# Patient Record
Sex: FEMALE | Race: WHITE | Employment: OTHER | URBAN - METROPOLITAN AREA
[De-identification: names, ages, dates, MRNs, and addresses within clinical notes are randomized per-mention and may not be internally consistent; named-entity substitution may affect disease eponyms.]

---

## 2017-01-03 ENCOUNTER — HOSPITAL ENCOUNTER (INPATIENT)
Facility: HOSPITAL | Age: 42
LOS: 1 days | Discharge: HOME | DRG: 072 | End: 2017-01-04
Attending: NEUROLOGICAL SURGERY | Admitting: NEUROLOGICAL SURGERY
Payer: MEDICARE

## 2017-01-03 ENCOUNTER — OFFICE VISIT (OUTPATIENT)
Dept: NEUROLOGY | Facility: CLINIC | Age: 42
End: 2017-01-03

## 2017-01-03 ENCOUNTER — APPOINTMENT (OUTPATIENT)
Dept: CT IMAGING | Facility: HOSPITAL | Age: 42
DRG: 072 | End: 2017-01-03
Attending: NEUROLOGICAL SURGERY
Payer: MEDICARE

## 2017-01-03 ENCOUNTER — NURSE ONLY (OUTPATIENT)
Dept: HEMATOLOGY/ONCOLOGY | Facility: HOSPITAL | Age: 42
End: 2017-01-03
Attending: NEUROLOGICAL SURGERY
Payer: OTHER GOVERNMENT

## 2017-01-03 VITALS
RESPIRATION RATE: 18 BRPM | DIASTOLIC BLOOD PRESSURE: 70 MMHG | TEMPERATURE: 99 F | HEIGHT: 67.01 IN | BODY MASS INDEX: 24.7 KG/M2 | WEIGHT: 157.38 LBS | HEART RATE: 71 BPM | SYSTOLIC BLOOD PRESSURE: 112 MMHG

## 2017-01-03 DIAGNOSIS — Z98.890 S/P CRANIOTOMY: Primary | ICD-10-CM

## 2017-01-03 DIAGNOSIS — D32.9 MENINGIOMA (HCC): ICD-10-CM

## 2017-01-03 DIAGNOSIS — T88.8XXA FLUID COLLECTION AT SURGICAL SITE, INITIAL ENCOUNTER: ICD-10-CM

## 2017-01-03 DIAGNOSIS — R90.0 INTRACRANIAL MASS: ICD-10-CM

## 2017-01-03 PROBLEM — G93.89 SUBGALEAL FLUID COLLECTION: Status: ACTIVE | Noted: 2017-01-03

## 2017-01-03 LAB
APTT PPP: 31.3 SECONDS (ref 25–34)
BASOPHILS # BLD AUTO: 0.11 X10(3) UL (ref 0–0.1)
BASOPHILS NFR BLD AUTO: 0.8 %
BUN BLD-MCNC: 13 MG/DL (ref 8–20)
CALCIUM BLD-MCNC: 8.6 MG/DL (ref 8.3–10.3)
CHLORIDE: 107 MMOL/L (ref 101–111)
CO2: 24 MMOL/L (ref 22–32)
CREAT BLD-MCNC: 0.77 MG/DL (ref 0.55–1.02)
EOSINOPHIL # BLD AUTO: 0.21 X10(3) UL (ref 0–0.3)
EOSINOPHIL NFR BLD AUTO: 1.6 %
ERYTHROCYTE [DISTWIDTH] IN BLOOD BY AUTOMATED COUNT: 12.4 % (ref 11.5–16)
GLUCOSE BLD-MCNC: 126 MG/DL (ref 70–99)
HCT VFR BLD AUTO: 35.7 % (ref 34–50)
HGB BLD-MCNC: 12.1 G/DL (ref 12–16)
IMMATURE GRANULOCYTE COUNT: 0.11 X10(3) UL (ref 0–1)
IMMATURE GRANULOCYTE RATIO %: 0.8 %
INR BLD: 0.95 (ref 0.89–1.12)
LYMPHOCYTES # BLD AUTO: 2.52 X10(3) UL (ref 0.9–4)
LYMPHOCYTES NFR BLD AUTO: 19.4 %
MCH RBC QN AUTO: 30 PG (ref 27–33.2)
MCHC RBC AUTO-ENTMCNC: 33.9 G/DL (ref 31–37)
MCV RBC AUTO: 88.6 FL (ref 81–100)
MONOCYTES # BLD AUTO: 0.52 X10(3) UL (ref 0.1–0.6)
MONOCYTES NFR BLD AUTO: 4 %
NEUTROPHIL ABS PRELIM: 9.49 X10 (3) UL (ref 1.3–6.7)
NEUTROPHILS # BLD AUTO: 9.49 X10(3) UL (ref 1.3–6.7)
NEUTROPHILS NFR BLD AUTO: 73.4 %
PLATELET # BLD AUTO: 225 10(3)UL (ref 150–450)
POTASSIUM SERPL-SCNC: 3.6 MMOL/L (ref 3.6–5.1)
PSA SERPL DL<=0.01 NG/ML-MCNC: 13 SECONDS (ref 12.3–14.8)
RBC # BLD AUTO: 4.03 X10(6)UL (ref 3.8–5.1)
RED CELL DISTRIBUTION WIDTH-SD: 40.5 FL (ref 35.1–46.3)
SODIUM SERPL-SCNC: 138 MMOL/L (ref 136–144)
WBC # BLD AUTO: 13 X10(3) UL (ref 4–13)

## 2017-01-03 PROCEDURE — 71275 CT ANGIOGRAPHY CHEST: CPT

## 2017-01-03 PROCEDURE — 99024 POSTOP FOLLOW-UP VISIT: CPT | Performed by: NURSE PRACTITIONER

## 2017-01-03 PROCEDURE — 0W9130Z DRAINAGE OF CRANIAL CAVITY WITH DRAINAGE DEVICE, PERCUTANEOUS APPROACH: ICD-10-PCS | Performed by: NEUROLOGICAL SURGERY

## 2017-01-03 PROCEDURE — 99211 OFF/OP EST MAY X REQ PHY/QHP: CPT

## 2017-01-03 RX ORDER — CODEINE SULFATE 30 MG/1
30 TABLET ORAL EVERY 4 HOURS PRN
Status: DISCONTINUED | OUTPATIENT
Start: 2017-01-03 | End: 2017-01-04

## 2017-01-03 RX ORDER — LEVETIRACETAM 500 MG/1
500 TABLET ORAL 2 TIMES DAILY
Status: DISCONTINUED | OUTPATIENT
Start: 2017-01-03 | End: 2017-01-04

## 2017-01-03 RX ORDER — PANTOPRAZOLE SODIUM 20 MG/1
20 TABLET, DELAYED RELEASE ORAL
Status: DISCONTINUED | OUTPATIENT
Start: 2017-01-04 | End: 2017-01-04

## 2017-01-03 RX ORDER — CODEINE SULFATE 30 MG/1
TABLET ORAL EVERY 4 HOURS PRN
Status: DISCONTINUED | OUTPATIENT
Start: 2017-01-03 | End: 2017-01-03 | Stop reason: SDUPTHER

## 2017-01-03 RX ORDER — CODEINE SULFATE 30 MG/1
60 TABLET ORAL EVERY 4 HOURS PRN
Status: DISCONTINUED | OUTPATIENT
Start: 2017-01-03 | End: 2017-01-04

## 2017-01-03 RX ORDER — SODIUM CHLORIDE 9 MG/ML
INJECTION, SOLUTION INTRAVENOUS CONTINUOUS
Status: DISCONTINUED | OUTPATIENT
Start: 2017-01-03 | End: 2017-01-04

## 2017-01-03 RX ORDER — ACETAMINOPHEN 500 MG
1000 TABLET ORAL EVERY 6 HOURS PRN
Status: DISCONTINUED | OUTPATIENT
Start: 2017-01-03 | End: 2017-01-04

## 2017-01-03 RX ORDER — FLUTICASONE PROPIONATE 50 MCG
2 SPRAY, SUSPENSION (ML) NASAL DAILY PRN
Status: DISCONTINUED | OUTPATIENT
Start: 2017-01-03 | End: 2017-01-04

## 2017-01-03 RX ORDER — ONDANSETRON 2 MG/ML
4 INJECTION INTRAMUSCULAR; INTRAVENOUS EVERY 6 HOURS PRN
Status: DISCONTINUED | OUTPATIENT
Start: 2017-01-03 | End: 2017-01-04

## 2017-01-03 RX ORDER — ONDANSETRON 2 MG/ML
INJECTION INTRAMUSCULAR; INTRAVENOUS
Status: DISPENSED
Start: 2017-01-03 | End: 2017-01-04

## 2017-01-03 RX ORDER — ONDANSETRON 2 MG/ML
4 INJECTION INTRAMUSCULAR; INTRAVENOUS ONCE
Status: COMPLETED | OUTPATIENT
Start: 2017-01-03 | End: 2017-01-03

## 2017-01-03 NOTE — PROGRESS NOTES
[address] [facility],[address]    Multidisciplinary Neuro-Oncology Clinic  Outpatient Follow up      Jaycee Benitez  : 1975  1/3/2017  PCP: None Pcp     REASON FOR VISIT:   F/o following left frontal craniotomy 26      HIS daily as needed for Rhinitis. Disp:  Rfl:    HydrOXYzine HCl 10 MG Oral Tab Take 10 mg by mouth nightly as needed. Disp:  Rfl:    LORazepam 0.5 MG Oral Tab Take 0.5 mg by mouth 2 (two) times daily as needed for Anxiety.  Disp:  Rfl:    omeprazole 20 MG Oral of the    resection cavity with a small nodular focus measuring up to 4 x 5 mm anteriorly that could be due to reactive changes from the recent surgery, with a small amount of residual tumor not entirely excluded.  There are minimal areas of restricted diff options surgical and non surgical were reviewed and discussed at length. All questions were answered. 1/3/2017; Dr Chidi Mendoza examined  Jessie Corea.  [facility], [facility]  [address] [facility], [address] [facility] Tunde Alvarado, [address]  [phone]

## 2017-01-03 NOTE — PROGRESS NOTES
Education Record    Learner:  Patient    Disease / Diagnosis:    Barriers / Limitations:  None   Comments:    Method:  Discussion   Comments:    General Topics:  Plan of care reviewed   Comments:    Outcome:  Shows understanding   Comments:    Admission to

## 2017-01-03 NOTE — PATIENT INSTRUCTIONS
Please do MRI brain with and without contrast 2/13/17 prior to follow-up in neuro-oncology clinic 2/14/17

## 2017-01-04 ENCOUNTER — APPOINTMENT (OUTPATIENT)
Dept: ULTRASOUND IMAGING | Facility: HOSPITAL | Age: 42
DRG: 072 | End: 2017-01-04
Attending: NEUROLOGICAL SURGERY
Payer: MEDICARE

## 2017-01-04 VITALS
TEMPERATURE: 99 F | RESPIRATION RATE: 14 BRPM | SYSTOLIC BLOOD PRESSURE: 112 MMHG | OXYGEN SATURATION: 96 % | DIASTOLIC BLOOD PRESSURE: 68 MMHG | HEART RATE: 70 BPM

## 2017-01-04 LAB
BASOPHILS # BLD AUTO: 0.09 X10(3) UL (ref 0–0.1)
BASOPHILS NFR BLD AUTO: 1.1 %
BUN BLD-MCNC: 11 MG/DL (ref 8–20)
CALCIUM BLD-MCNC: 8.6 MG/DL (ref 8.3–10.3)
CHLORIDE: 108 MMOL/L (ref 101–111)
CO2: 25 MMOL/L (ref 22–32)
CREAT BLD-MCNC: 0.76 MG/DL (ref 0.55–1.02)
EOSINOPHIL # BLD AUTO: 0.23 X10(3) UL (ref 0–0.3)
EOSINOPHIL NFR BLD AUTO: 2.8 %
ERYTHROCYTE [DISTWIDTH] IN BLOOD BY AUTOMATED COUNT: 12.4 % (ref 11.5–16)
GLUCOSE BLD-MCNC: 94 MG/DL (ref 70–99)
HCT VFR BLD AUTO: 36.2 % (ref 34–50)
HGB BLD-MCNC: 12.3 G/DL (ref 12–16)
IMMATURE GRANULOCYTE COUNT: 0.09 X10(3) UL (ref 0–1)
IMMATURE GRANULOCYTE RATIO %: 1.1 %
LYMPHOCYTES # BLD AUTO: 3.3 X10(3) UL (ref 0.9–4)
LYMPHOCYTES NFR BLD AUTO: 40.1 %
MCH RBC QN AUTO: 29.9 PG (ref 27–33.2)
MCHC RBC AUTO-ENTMCNC: 34 G/DL (ref 31–37)
MCV RBC AUTO: 88.1 FL (ref 81–100)
MONOCYTES # BLD AUTO: 0.45 X10(3) UL (ref 0.1–0.6)
MONOCYTES NFR BLD AUTO: 5.5 %
NEUTROPHIL ABS PRELIM: 4.07 X10 (3) UL (ref 1.3–6.7)
NEUTROPHILS # BLD AUTO: 4.07 X10(3) UL (ref 1.3–6.7)
NEUTROPHILS NFR BLD AUTO: 49.4 %
PLATELET # BLD AUTO: 216 10(3)UL (ref 150–450)
POTASSIUM SERPL-SCNC: 4.1 MMOL/L (ref 3.6–5.1)
RBC # BLD AUTO: 4.11 X10(6)UL (ref 3.8–5.1)
RED CELL DISTRIBUTION WIDTH-SD: 40.2 FL (ref 35.1–46.3)
SODIUM SERPL-SCNC: 141 MMOL/L (ref 136–144)
WBC # BLD AUTO: 8.2 X10(3) UL (ref 4–13)

## 2017-01-04 PROCEDURE — 93970 EXTREMITY STUDY: CPT

## 2017-01-04 PROCEDURE — 99254 IP/OBS CNSLTJ NEW/EST MOD 60: CPT | Performed by: OTHER

## 2017-01-04 RX ORDER — SULFAMETHOXAZOLE AND TRIMETHOPRIM 800; 160 MG/1; MG/1
1 TABLET ORAL 2 TIMES DAILY
Qty: 14 TABLET | Refills: 0 | Status: SHIPPED | OUTPATIENT
Start: 2017-01-04 | End: 2017-01-11

## 2017-01-04 RX ORDER — CODEINE SULFATE 60 MG/1
TABLET ORAL EVERY 6 HOURS PRN
Qty: 40 TABLET | Refills: 0 | Status: SHIPPED | OUTPATIENT
Start: 2017-01-04 | End: 2017-01-18

## 2017-01-04 NOTE — PAYOR COMM NOTE
Attending Physician: Hamzah Stiles DO    Review Type: ADMISSION   Reviewer: Natacha العلي       Date: January 4, 2017 - 8:48 AM  Payor: [facility]  Authorization Number: N/A  Admit date: 1/3/2017  4:01 PM   Admitted from Emergency Dept.: (SUBLIMAZE) 0.05 MG/ML injection 50 mcg     Date Action Dose Route User    1/3/2017 1747 Given 50 mcg Intravenous Sallye Nageotte, RN      iohexol (OMNIPAQUE) 350 MG/ML injection 60 mL     Date Action Dose Route User    1/3/2017 2320 Given 60 mL Intravenou CBC W/ DIFFERENTIAL[[id number]]          Abnormal            Final result                 Please view results for these tests on the individual orders. CBC WITH DIFFERENTIAL WITH PLATELET    Narrative:      The following orders were created

## 2017-01-04 NOTE — H&P
[facility] Neurosurgery  H&P      Mara Wilson  : 1975  1/3/2017  PCP: None Pcp      CC: scalp fluid collection      HISTORY OF PRESENT ILLNESS:  Mara Wilson is a 35year old female. reports tenderness to incision.  she feels fluid collection has gotten lar mouth nightly as needed.  Disp:   Rfl:     LORazepam 0.5 MG Oral Tab  Take 0.5 mg by mouth 2 (two) times daily as needed for Anxiety.  Disp:   Rfl:     omeprazole 20 MG Oral Capsule Delayed Release  Take 20 mg by mouth 2 (two) times daily before meals.  Ivone Richter to 4 x 5 mm anteriorly that could be due to reactive changes from the recent surgery, with a small amount of residual tumor not entirely excluded.  There are minimal areas of restricted diffusion on   the margins of the resection cavity which could be due t discussed at length. All questions were answered. 1/3/2017; Dr Rosie Walton examined  Damon Cool.  GEE Jeffery  [facility]  [address] [facility], [address] [place] [address] [facility], [address] [place] [address] Rd  [phone]  10:45 AM    I have seen and examined the patient a history of meningioma. Venous Doppler BL LE since patient has complained of edema and SOB. No anticoagulants tonight while we see how drain works. Manda Shelton

## 2017-01-04 NOTE — PROGRESS NOTES
[id number] [facility] Progress Note    Shahana Barton Patient Status:  Inpatient    1975 MRN [id number]   [facility] 6NE-A Attending Montse Epps, DO   Hosp Day # 1 PCP None Pcp     Subjective:  Shahana Barton is a(n) 35 year home later today once seen by surgeon  Above discussed with Dr. Tasneem Leon, APN  [address] [facility] [address]  Pager [phone]  1/4/2017, 10:14 AM

## 2017-01-04 NOTE — PROGRESS NOTES
Received patient as a direct admit from Neurosurgery office. Oriented patient to room and instructed to change into gown. Dr. Bailey Day at bedside to perform aspiration of fluid collection with placement of subgaleal drain.  Correct patient, procedure, size, an

## 2017-01-04 NOTE — PROGRESS NOTES
[facility]  Neurocritical Care APN Progress Note    NAME: Bianca Felipe - ROOM: [facility]/2367-R - MRN: [id number] - Age: 35year old - :1975    History Of Present Illness:  Bianca Felipe is a 35year old female who is s/p craniotomy for or leg weakness noted. 5/5 bilaterally. Finger-to-nose coordination is intact. Gait deferred.     Data this admission:  CTA chest:  CONCLUSION:  No CT evidence for pulmonary embolism.     3.8mm subpleural nodule right upper lobe anteriorly.     Bilateral

## 2017-01-04 NOTE — PLAN OF CARE
NEUROLOGICAL - ADULT    • Achieves stable or improved neurological status Progressing    • Absence of seizures Progressing    • Remains free of injury related to seizure activity Progressing    • Achieves maximal functionality and self care Progressing

## 2017-01-04 NOTE — CONSULTS
[facility]  Neurocritical Care       Name: Luis Cassidy  MRN: [id number]  Admission Date/Time: 1/3/2017  4:01 PM  Primary Care Provider:  None Pcp        Reason for Consultation:    Headache, side effects from [address].     HPI:   Patient is Disp:  Rfl:  Not Taking   omeprazole 20 MG Oral Capsule Delayed Release Take 20 mg by mouth 2 (two) times daily before meals.  Disp:  Rfl:  Not Taking   Phenylephrine in Hard Fat 0.25 % Rectal Suppos Place 1 suppository rectally daily as needed for Hemorrho or open areas. Neuro:                  Headache, side effects from [address]. All other systems were reviewed and are negative. Vitals:   Blood pressure 118/75, pulse 83, temperature 98.7 °F (37.1 °C), temperature source Oral, resp.  rate 16, last menst USED:  60 cc of Omnipaque 350       FINDINGS:  VASCULATURE:  No evidence of pulmonary embolism. THORACIC AORTA:  No thoracic aortic dissection. LUNGS:  3.8 mm subpleural nodule in the right upper lobe. No focal pulmonary infiltrate or consolidation. . MEDIA 31.3 01/03/2017   INR 0.95 01/03/2017       Assesment/Plan:   Active Problems:    Subgaleal fluid collection    1. Subgaleal fluid collection s/p left pterional craniotomy              -Drain managed by NS, 20cc removed yesterday by [facility].

## 2017-01-04 NOTE — PLAN OF CARE
Assumed care at 1915, patient resting in bed, alert and oriented x4, VSS per monitor, subgaleal drain in place, open to gravity, no drainage noted.  Blood labs drawn, 20g IV started to right [facility], patient taken for CTA of chest. Spoke with Dr. Edin Mena via phone

## 2017-01-05 ENCOUNTER — TELEPHONE (OUTPATIENT)
Dept: SURGERY | Facility: CLINIC | Age: 42
End: 2017-01-05

## 2017-01-05 NOTE — DISCHARGE SUMMARY
[facility]  Discharge Summary    Bandar Pena Patient Status:  Inpatient    1975 MRN [id number]   [facility] 7NE-A Attending No att. providers found   Hosp Day # 1 PCP None Pcp     Date of Admission: 1/3/2017    Date of Discharge: placement for extended drainage. The patient also has described an intolerance to Keppra as she states it makes her agitated and this was tapered during the admission off entirely by the neurologist.  See their notes for further detail.        [facility] week.    Follow up Labs: None. Other Discharge Instructions: Patient is to follow-up with neurosurgery in the clinic in 1 week.   She is to wear a snug dressing that compresses the scalp on the left frontal temporal fossa to the skull temporalis fascia

## 2017-01-05 NOTE — PROCEDURES
Date of procedure: January 3, 2017. Procedure: Placement of left frontal subgaleal catheter for drainage.     Preprocedure diagnosis: left frontal subgaleal fluid collection    Postop adenosis: Left frontal subgaleal fluid collection    Indication for pr catheter was hooked up to an EVD Bare troll draining device. The drainage. Trial was placed lower than the patient's body for gravity draining.   No egressive fluid was shown in the catheter as the initial aspirate clear the palpable subgaleal fluid colle

## 2017-01-05 NOTE — TELEPHONE ENCOUNTER
Informed patient, she states her brother filled Bactrim at outside pharmacy  Appreciated the call back.

## 2017-01-05 NOTE — TELEPHONE ENCOUNTER
Patient asking to have our office fax or call in her prescription for antibiotic  She does not need Narcotic called in.   Provided # for [facility] [phone], Dr. Garcia Zimmerman with pharmacist, they do not accept outside prescriptions  Order has to come

## 2017-01-09 ENCOUNTER — TELEPHONE (OUTPATIENT)
Dept: SURGERY | Facility: CLINIC | Age: 42
End: 2017-01-09

## 2017-01-09 NOTE — TELEPHONE ENCOUNTER
Spoke with patient who stated she had a procedure done on 1/3/17 by [facility] for fluid drainage. Patient was told she could rewrap this so she did this on Saturday.  When she woke up Sunday morning she could feel fluid moving around again and was starting t

## 2017-01-10 ENCOUNTER — NURSE ONLY (OUTPATIENT)
Dept: HEMATOLOGY/ONCOLOGY | Facility: HOSPITAL | Age: 42
End: 2017-01-10
Attending: NEUROLOGICAL SURGERY
Payer: OTHER GOVERNMENT

## 2017-01-10 ENCOUNTER — OFFICE VISIT (OUTPATIENT)
Dept: NEUROLOGY | Facility: CLINIC | Age: 42
End: 2017-01-10

## 2017-01-10 VITALS
HEART RATE: 91 BPM | RESPIRATION RATE: 20 BRPM | SYSTOLIC BLOOD PRESSURE: 105 MMHG | WEIGHT: 156.63 LBS | HEIGHT: 67.01 IN | BODY MASS INDEX: 24.58 KG/M2 | TEMPERATURE: 100 F | DIASTOLIC BLOOD PRESSURE: 66 MMHG | OXYGEN SATURATION: 98 %

## 2017-01-10 DIAGNOSIS — R90.0 INTRACRANIAL MASS: ICD-10-CM

## 2017-01-10 DIAGNOSIS — Z98.890 S/P CRANIOTOMY: Primary | ICD-10-CM

## 2017-01-10 DIAGNOSIS — D33.0 BENIGN NEOPLASM OF SUPRATENTORIAL REGION OF BRAIN (HCC): ICD-10-CM

## 2017-01-10 PROCEDURE — 99211 OFF/OP EST MAY X REQ PHY/QHP: CPT

## 2017-01-10 PROCEDURE — 99024 POSTOP FOLLOW-UP VISIT: CPT | Performed by: NURSE PRACTITIONER

## 2017-01-10 NOTE — PROGRESS NOTES
[address] [facility],[address]    Multidisciplinary Neuro-Oncology Clinic  Outpatient Follow up      Luiz Roa  : 1975  1/10/2017  PCP: None Pcp  Referring Provider: No ref.  provider found    REASON FOR VISIT:  Post-op f/u year old female. Incision:Left temporal incision edges well approximated, no drainage, no erythema. Mild tenderness. Temporal muscle wasting w/ visible pulsation. Small fluid collection.  Pressure dressing in place and removed for examination  NEUROLOGICAL fluid.  Since she has marked atrophy of the temporalis muscle we may use bone cement to rebuild and buttress the temporalis muscle for cosmesis. This may have to be postponed if there is any infection present. Manda Shelton

## 2017-01-10 NOTE — PROGRESS NOTES
Education Record    Learner:  Patient and Spouse    Disease / Diagnosis:    Barriers / Limitations:  None   Comments:    Method:  Discussion   Comments:    General Topics:  Plan of care reviewed   Comments:    Outcome:  Shows understanding   Comments:

## 2017-01-12 ENCOUNTER — TELEPHONE (OUTPATIENT)
Dept: SURGERY | Facility: CLINIC | Age: 42
End: 2017-01-12

## 2017-01-17 ENCOUNTER — APPOINTMENT (OUTPATIENT)
Dept: HEMATOLOGY/ONCOLOGY | Facility: HOSPITAL | Age: 42
End: 2017-01-17
Attending: NEUROLOGICAL SURGERY
Payer: OTHER GOVERNMENT

## 2017-01-18 ENCOUNTER — OFFICE VISIT (OUTPATIENT)
Dept: SURGERY | Facility: CLINIC | Age: 42
End: 2017-01-18

## 2017-01-18 VITALS
RESPIRATION RATE: 14 BRPM | BODY MASS INDEX: 23.7 KG/M2 | WEIGHT: 151 LBS | SYSTOLIC BLOOD PRESSURE: 112 MMHG | DIASTOLIC BLOOD PRESSURE: 60 MMHG | HEART RATE: 78 BPM | HEIGHT: 67 IN

## 2017-01-18 DIAGNOSIS — Z98.890 POST-OPERATIVE STATE: Primary | ICD-10-CM

## 2017-01-18 PROCEDURE — 99024 POSTOP FOLLOW-UP VISIT: CPT | Performed by: NEUROLOGICAL SURGERY

## 2017-01-18 NOTE — PATIENT INSTRUCTIONS
Refill policies:    • Allow 2 business days for refills; controlled substances may take longer.   • Contact your pharmacy at least 5 days prior to running out of medication and have them send an electronic request or submit request through the “request re your physician has recommended that you have a procedure or additional testing performed. [facility] will contact your insurance carrier to obtain pre-certification or prior authorization.     Unfortunately, VANDANA has seen an increas

## 2017-01-19 ENCOUNTER — TELEPHONE (OUTPATIENT)
Dept: SURGERY | Facility: CLINIC | Age: 42
End: 2017-01-19

## 2017-01-19 DIAGNOSIS — R90.0 INTRACRANIAL MASS: Primary | ICD-10-CM

## 2017-01-19 NOTE — TELEPHONE ENCOUNTER
Lashonda for surgery authorization  Told she was never referred to [facility] we must start authorization with PCP office  Transferred and then disconnected    (total time 44:10)    Informed patient of the above, she provided another contact # [phone]

## 2017-01-19 NOTE — TELEPHONE ENCOUNTER
Patient scheduled for Left frontal cranial wound exploration and cranioplasty.  on 1/24/17 with Dr. Marisa Brownlee    Pre-op instructions discussed with patient and surgical packet provided:    · You will need to contact the Pre-admission department at [phone]

## 2017-01-19 NOTE — TELEPHONE ENCOUNTER
[employer] form and clinicals faxed to [facility] for surgery auth  Confirmation received

## 2017-01-20 RX ORDER — ACETAMINOPHEN 325 MG/1
325 TABLET ORAL EVERY 6 HOURS PRN
COMMUNITY

## 2017-01-20 RX ORDER — IBUPROFEN 200 MG
200 TABLET ORAL EVERY 6 HOURS PRN
Status: ON HOLD | COMMUNITY
End: 2017-01-25

## 2017-01-21 ENCOUNTER — LAB ENCOUNTER (OUTPATIENT)
Dept: LAB | Age: 42
End: 2017-01-21
Attending: NEUROLOGICAL SURGERY
Payer: OTHER GOVERNMENT

## 2017-01-21 DIAGNOSIS — D49.6 BRAIN TUMOR (HCC): ICD-10-CM

## 2017-01-21 LAB
ANTIBODY SCREEN: NEGATIVE
BILIRUB UR QL STRIP.AUTO: NEGATIVE
CLARITY UR REFRACT.AUTO: CLEAR
GLUCOSE UR STRIP.AUTO-MCNC: NEGATIVE MG/DL
KETONES UR STRIP.AUTO-MCNC: NEGATIVE MG/DL
LEUKOCYTE ESTERASE UR QL STRIP.AUTO: NEGATIVE
NITRITE UR QL STRIP.AUTO: NEGATIVE
PH UR STRIP.AUTO: 7 [PH] (ref 4.5–8)
PROT UR STRIP.AUTO-MCNC: NEGATIVE MG/DL
RH BLOOD TYPE: POSITIVE
SP GR UR STRIP.AUTO: 1.01 (ref 1–1.03)
UROBILINOGEN UR STRIP.AUTO-MCNC: <2 MG/DL

## 2017-01-21 PROCEDURE — 86920 COMPATIBILITY TEST SPIN: CPT

## 2017-01-21 PROCEDURE — 86850 RBC ANTIBODY SCREEN: CPT

## 2017-01-21 PROCEDURE — 86901 BLOOD TYPING SEROLOGIC RH(D): CPT

## 2017-01-21 PROCEDURE — 81001 URINALYSIS AUTO W/SCOPE: CPT

## 2017-01-21 PROCEDURE — 86900 BLOOD TYPING SEROLOGIC ABO: CPT

## 2017-01-21 PROCEDURE — 36415 COLL VENOUS BLD VENIPUNCTURE: CPT

## 2017-01-23 ENCOUNTER — ANESTHESIA EVENT (OUTPATIENT)
Dept: SURGERY | Facility: HOSPITAL | Age: 42
DRG: 025 | End: 2017-01-23
Payer: MEDICARE

## 2017-01-23 NOTE — PROGRESS NOTES
[facility]   Outpatient Neurological Surgery Follow Up    Josiane Lara  : 1975  PCP: None Pcp  Referring Provider: No ref.  provider found    REASON FOR VISIT:Patient presents with:  Post-Op: sx 2016 possible ne bone flap and repair of cerebrospinal fluid leak. Will also entail cranioplasty with bone cement which should seal up cerebrospinal fluid leak as well as rebuild the temporalis muscle which is aesthetically unpleasing to the patient.   The patient has no s

## 2017-01-23 NOTE — TELEPHONE ENCOUNTER
Patient informed that we have not heard back from [place] regarding authorization yet. Patient understood, but would like to stay on the surgery schedule for tomorrow.

## 2017-01-24 ENCOUNTER — ANESTHESIA (OUTPATIENT)
Dept: SURGERY | Facility: HOSPITAL | Age: 42
DRG: 025 | End: 2017-01-24
Payer: MEDICARE

## 2017-01-24 ENCOUNTER — SURGERY (OUTPATIENT)
Age: 42
End: 2017-01-24

## 2017-01-24 ENCOUNTER — HOSPITAL ENCOUNTER (INPATIENT)
Facility: HOSPITAL | Age: 42
LOS: 8 days | Discharge: HOME | DRG: 025 | End: 2017-02-01
Attending: NEUROLOGICAL SURGERY | Admitting: NEUROLOGICAL SURGERY
Payer: MEDICARE

## 2017-01-24 DIAGNOSIS — G93.89 LEFT FRONTAL LOBE MASS: ICD-10-CM

## 2017-01-24 DIAGNOSIS — R90.0 INTRACRANIAL MASS: ICD-10-CM

## 2017-01-24 DIAGNOSIS — D49.6 BRAIN TUMOR (HCC): Primary | ICD-10-CM

## 2017-01-24 DIAGNOSIS — D33.2 BENIGN NEOPLASM OF BRAIN, UNSPECIFIED BRAIN REGION (HCC): ICD-10-CM

## 2017-01-24 LAB
BUN BLD-MCNC: 14 MG/DL (ref 8–20)
CALCIUM BLD-MCNC: 7.7 MG/DL (ref 8.3–10.3)
CHLORIDE: 110 MMOL/L (ref 101–111)
CO2: 20 MMOL/L (ref 22–32)
CREAT BLD-MCNC: 0.96 MG/DL (ref 0.55–1.02)
ERYTHROCYTE [DISTWIDTH] IN BLOOD BY AUTOMATED COUNT: 12.2 % (ref 11.5–16)
GLUCOSE BLD-MCNC: 145 MG/DL (ref 70–99)
HAV IGM SER QL: 1.8 MG/DL (ref 1.7–3)
HCT VFR BLD AUTO: 34.4 % (ref 34–50)
HGB BLD-MCNC: 11.2 G/DL (ref 12–16)
MCH RBC QN AUTO: 29.5 PG (ref 27–33.2)
MCHC RBC AUTO-ENTMCNC: 32.6 G/DL (ref 31–37)
MCV RBC AUTO: 90.5 FL (ref 81–100)
PLATELET # BLD AUTO: 201 10(3)UL (ref 150–450)
POTASSIUM SERPL-SCNC: 3.9 MMOL/L (ref 3.6–5.1)
RBC # BLD AUTO: 3.8 X10(6)UL (ref 3.8–5.1)
RED CELL DISTRIBUTION WIDTH-SD: 40.2 FL (ref 35.1–46.3)
SODIUM SERPL-SCNC: 141 MMOL/L (ref 136–144)
WBC # BLD AUTO: 10.6 X10(3) UL (ref 4–13)

## 2017-01-24 PROCEDURE — 00B00ZZ EXCISION OF BRAIN, OPEN APPROACH: ICD-10-PCS | Performed by: NEUROLOGICAL SURGERY

## 2017-01-24 PROCEDURE — 0NQ20ZZ: ICD-10-PCS | Performed by: NEUROLOGICAL SURGERY

## 2017-01-24 PROCEDURE — 99254 IP/OBS CNSLTJ NEW/EST MOD 60: CPT | Performed by: OTHER

## 2017-01-24 PROCEDURE — 00J00ZZ INSPECTION OF BRAIN, OPEN APPROACH: ICD-10-PCS | Performed by: NEUROLOGICAL SURGERY

## 2017-01-24 DEVICE — DIRECTINJECT ON-DEMAND HA CEMENT
Type: IMPLANTABLE DEVICE | Site: HEAD | Status: FUNCTIONAL
Brand: DIRECT INJECT

## 2017-01-24 DEVICE — DURASEAL® DURAL SEALANT SYSTEM 5ML 5 PACK
Type: IMPLANTABLE DEVICE | Site: HEAD | Status: FUNCTIONAL
Brand: DURASEAL®

## 2017-01-24 RX ORDER — METOCLOPRAMIDE HYDROCHLORIDE 5 MG/ML
10 INJECTION INTRAMUSCULAR; INTRAVENOUS AS NEEDED
Status: DISPENSED | OUTPATIENT
Start: 2017-01-24 | End: 2017-01-24

## 2017-01-24 RX ORDER — ONDANSETRON 2 MG/ML
INJECTION INTRAMUSCULAR; INTRAVENOUS
Status: COMPLETED
Start: 2017-01-24 | End: 2017-01-24

## 2017-01-24 RX ORDER — MAGNESIUM HYDROXIDE 1200 MG/15ML
LIQUID ORAL CONTINUOUS PRN
Status: DISCONTINUED | OUTPATIENT
Start: 2017-01-24 | End: 2017-01-24

## 2017-01-24 RX ORDER — NALOXONE HYDROCHLORIDE 0.4 MG/ML
80 INJECTION, SOLUTION INTRAMUSCULAR; INTRAVENOUS; SUBCUTANEOUS AS NEEDED
Status: ACTIVE | OUTPATIENT
Start: 2017-01-24 | End: 2017-01-24

## 2017-01-24 RX ORDER — LORAZEPAM 2 MG/ML
0.5 INJECTION INTRAMUSCULAR ONCE
Status: COMPLETED | OUTPATIENT
Start: 2017-01-24 | End: 2017-01-24

## 2017-01-24 RX ORDER — MORPHINE SULFATE 2 MG/ML
2 INJECTION, SOLUTION INTRAMUSCULAR; INTRAVENOUS EVERY 5 MIN PRN
Status: ACTIVE | OUTPATIENT
Start: 2017-01-24 | End: 2017-01-24

## 2017-01-24 RX ORDER — DEXAMETHASONE SODIUM PHOSPHATE 4 MG/ML
6 VIAL (ML) INJECTION ONCE
Status: COMPLETED | OUTPATIENT
Start: 2017-01-24 | End: 2017-01-24

## 2017-01-24 RX ORDER — SODIUM CHLORIDE, SODIUM LACTATE, POTASSIUM CHLORIDE, CALCIUM CHLORIDE 600; 310; 30; 20 MG/100ML; MG/100ML; MG/100ML; MG/100ML
INJECTION, SOLUTION INTRAVENOUS CONTINUOUS
Status: DISCONTINUED | OUTPATIENT
Start: 2017-01-24 | End: 2017-01-25

## 2017-01-24 RX ORDER — HYDRALAZINE HYDROCHLORIDE 20 MG/ML
10 INJECTION INTRAMUSCULAR; INTRAVENOUS
Status: DISCONTINUED | OUTPATIENT
Start: 2017-01-24 | End: 2017-02-01

## 2017-01-24 RX ORDER — DIPHENHYDRAMINE HYDROCHLORIDE 50 MG/ML
12.5 INJECTION INTRAMUSCULAR; INTRAVENOUS AS NEEDED
Status: ACTIVE | OUTPATIENT
Start: 2017-01-24 | End: 2017-01-24

## 2017-01-24 RX ORDER — MEPERIDINE HYDROCHLORIDE 25 MG/ML
12.5 INJECTION INTRAMUSCULAR; INTRAVENOUS; SUBCUTANEOUS AS NEEDED
Status: DISCONTINUED | OUTPATIENT
Start: 2017-01-24 | End: 2017-01-25

## 2017-01-24 RX ORDER — BACITRACIN 50000 [USP'U]/1
INJECTION, POWDER, LYOPHILIZED, FOR SOLUTION INTRAMUSCULAR AS NEEDED
Status: DISCONTINUED | OUTPATIENT
Start: 2017-01-24 | End: 2017-01-24

## 2017-01-24 RX ORDER — ONDANSETRON 2 MG/ML
4 INJECTION INTRAMUSCULAR; INTRAVENOUS AS NEEDED
Status: ACTIVE | OUTPATIENT
Start: 2017-01-24 | End: 2017-01-24

## 2017-01-24 RX ORDER — MORPHINE SULFATE 2 MG/ML
INJECTION, SOLUTION INTRAMUSCULAR; INTRAVENOUS
Status: COMPLETED
Start: 2017-01-24 | End: 2017-01-24

## 2017-01-24 RX ORDER — SCOLOPAMINE TRANSDERMAL SYSTEM 1 MG/1
1 PATCH, EXTENDED RELEASE TRANSDERMAL
Status: DISCONTINUED | OUTPATIENT
Start: 2017-01-24 | End: 2017-02-01

## 2017-01-24 RX ORDER — SODIUM CHLORIDE 9 MG/ML
INJECTION, SOLUTION INTRAVENOUS CONTINUOUS
Status: DISCONTINUED | OUTPATIENT
Start: 2017-01-24 | End: 2017-01-29

## 2017-01-24 RX ORDER — TRAMADOL HYDROCHLORIDE 50 MG/1
TABLET ORAL EVERY 6 HOURS PRN
Status: DISCONTINUED | OUTPATIENT
Start: 2017-01-24 | End: 2017-02-01

## 2017-01-24 RX ORDER — PANTOPRAZOLE SODIUM 20 MG/1
20 TABLET, DELAYED RELEASE ORAL
Status: DISCONTINUED | OUTPATIENT
Start: 2017-01-25 | End: 2017-02-01

## 2017-01-24 RX ORDER — DOCUSATE SODIUM 100 MG/1
100 CAPSULE, LIQUID FILLED ORAL 2 TIMES DAILY
Status: DISCONTINUED | OUTPATIENT
Start: 2017-01-24 | End: 2017-02-01

## 2017-01-24 RX ORDER — HYDROMORPHONE HYDROCHLORIDE 1 MG/ML
INJECTION, SOLUTION INTRAMUSCULAR; INTRAVENOUS; SUBCUTANEOUS
Status: COMPLETED
Start: 2017-01-24 | End: 2017-01-24

## 2017-01-24 RX ORDER — PROMETHAZINE HYDROCHLORIDE 25 MG/1
12.5 SUPPOSITORY RECTAL ONCE
Status: COMPLETED | OUTPATIENT
Start: 2017-01-24 | End: 2017-01-24

## 2017-01-24 RX ORDER — ONDANSETRON 2 MG/ML
4 INJECTION INTRAMUSCULAR; INTRAVENOUS EVERY 6 HOURS PRN
Status: DISCONTINUED | OUTPATIENT
Start: 2017-01-24 | End: 2017-01-26

## 2017-01-24 RX ORDER — SODIUM CHLORIDE 9 MG/ML
INJECTION, SOLUTION INTRAVENOUS CONTINUOUS
Status: DISCONTINUED | OUTPATIENT
Start: 2017-01-24 | End: 2017-01-24 | Stop reason: HOSPADM

## 2017-01-24 RX ORDER — LABETALOL HYDROCHLORIDE 5 MG/ML
10 INJECTION, SOLUTION INTRAVENOUS AS NEEDED
Status: DISCONTINUED | OUTPATIENT
Start: 2017-01-24 | End: 2017-02-01

## 2017-01-24 RX ORDER — HYDROMORPHONE HYDROCHLORIDE 1 MG/ML
0.4 INJECTION, SOLUTION INTRAMUSCULAR; INTRAVENOUS; SUBCUTANEOUS EVERY 5 MIN PRN
Status: ACTIVE | OUTPATIENT
Start: 2017-01-24 | End: 2017-01-24

## 2017-01-24 NOTE — CONSULTS
[facility]  Neuro Critical Care Consult Note    Lowella Crest Patient Status:  Inpatient    1975 MRN [id number]   [facility] 6NE-A Attending Syed Ba, DO   Hosp Day # 0 PCP None Pcp     Date of Admission: 2017  Admissio MG Oral Tab Take 325 mg by mouth every 6 (six) hours as needed for Pain. Disp:  Rfl:    omeprazole 20 MG Oral Capsule Delayed Release Take 20 mg by mouth 2 (two) times daily before meals.  Disp:  Rfl:         Current Medications:    Current facility-adminis just received Ativan and tired.      OBJECTIVE:  Temp:  [97.3 °F (36.3 °C)-100.4 °F (38 °C)] 97.3 °F (36.3 °C)  Pulse:  [] 86  Resp:  [12-20] 13  BP: ()/(67-85) 98/67 mmHg  Intake/Output:    Intake/Output Summary (Last 24 hours) [facility]/17 [facility]  1/24/2017, 2:12 PM

## 2017-01-24 NOTE — ANESTHESIA PREPROCEDURE EVALUATION
PRE-OP EVALUATION    Patient Name: Reg Burrell    Pre-op Diagnosis: Intracranial mass [R90.0]    Procedure(s):  Left frontal cranial wound exploration and cranioplasty. Surgeon(s) and Role:     Ellyn Harrison, DO - Primary    Pre-op vitals reviewed. Use: No       Drug Use: No     Available pre-op labs reviewed.     Lab Results  Component Value Date   WBC 8.2 01/04/2017   RBC 4.11 01/04/2017   HGB 12.3 01/04/2017   HCT 36.2 01/04/2017   MCV 88.1 01/04/2017   MCH 29.9 01/04/2017   MCHC 34.0 01/04/2017

## 2017-01-24 NOTE — PROGRESS NOTES
Rec pt from PACU, pt w/ nausea and vomiting. Emesis x 5, Reglan IV given. Neuro intact, no deficits noted.   GEE Baca Neurosurgery updated, will contact Dr. Lourdes Rowell

## 2017-01-24 NOTE — H&P
NEUROSURGERY INITIAL CLINIC VISIT - NEUROSURGEON    PATIENT NAME: Luiz Roa, : 1975, 35year old female   MR# [id number] [facility]# [id number]    CHIEF COMPLAINT:   Temporal fluid collection    HPI:   35 F that is 2 months sp left craniotomy for tumor res EXAM:    Vitals: /77 mmHg  Pulse 65  Temp(Src) 98.1 °F (36.7 °C) (Oral)  Resp 16  Ht 67\"  Wt 155 lb 10.3 oz  BMI 24.37 kg/m2  SpO2 100%  LMP 11/14/2013  Aox4, nad  cn2-12 intact, eomi, perrl  Diaz spont 5/5  [place]  Left frontal fluctuance      Imaging

## 2017-01-24 NOTE — BRIEF OP NOTE
[address] [facility]  Brief Op Note     Mara Wilson Location: OR   [facility] [id number] MRN [id number]   Admission Date 1/24/2017 Operation Date 1/24/2017   Attending Physician Bernadette Dickson DO Operating Physician Alva Santiago.  Beatriz Smith DO       Pre-Operative Liane

## 2017-01-24 NOTE — ANESTHESIA POSTPROCEDURE EVALUATION
[address]  Patient Status:  Surgery Admit - Inpt   Age/Gender 35year old female MRN [id number]   Location [address] [facility] Attending Horacio Daugherty 55 Day # 0 PCP None Pcp       Anesthesia Post-op Note    Procedure(s):

## 2017-01-25 ENCOUNTER — TELEPHONE (OUTPATIENT)
Dept: SURGERY | Facility: CLINIC | Age: 42
End: 2017-01-25

## 2017-01-25 PROBLEM — Z91.81 AT RISK FOR FALLING: Status: ACTIVE | Noted: 2017-01-25

## 2017-01-25 LAB
APTT PPP: 33.7 SECONDS (ref 25–34)
BASOPHILS # BLD AUTO: 0.02 X10(3) UL (ref 0–0.1)
BASOPHILS NFR BLD AUTO: 0.1 %
BUN BLD-MCNC: 9 MG/DL (ref 8–20)
CALCIUM BLD-MCNC: 8.6 MG/DL (ref 8.3–10.3)
CHLORIDE: 109 MMOL/L (ref 101–111)
CO2: 26 MMOL/L (ref 22–32)
CREAT BLD-MCNC: 0.64 MG/DL (ref 0.55–1.02)
EOSINOPHIL # BLD AUTO: 0 X10(3) UL (ref 0–0.3)
EOSINOPHIL NFR BLD AUTO: 0 %
ERYTHROCYTE [DISTWIDTH] IN BLOOD BY AUTOMATED COUNT: 12 % (ref 11.5–16)
GLUCOSE BLD-MCNC: 122 MG/DL (ref 70–99)
HCT VFR BLD AUTO: 33 % (ref 34–50)
HGB BLD-MCNC: 11 G/DL (ref 12–16)
IMMATURE GRANULOCYTE COUNT: 0.13 X10(3) UL (ref 0–1)
IMMATURE GRANULOCYTE RATIO %: 0.7 %
INR BLD: 1.03 (ref 0.89–1.12)
LYMPHOCYTES # BLD AUTO: 1.65 X10(3) UL (ref 0.9–4)
LYMPHOCYTES NFR BLD AUTO: 9.1 %
MCH RBC QN AUTO: 29.5 PG (ref 27–33.2)
MCHC RBC AUTO-ENTMCNC: 33.3 G/DL (ref 31–37)
MCV RBC AUTO: 88.5 FL (ref 81–100)
MONOCYTES # BLD AUTO: 0.99 X10(3) UL (ref 0.1–0.6)
MONOCYTES NFR BLD AUTO: 5.5 %
NEUTROPHIL ABS PRELIM: 15.31 X10 (3) UL (ref 1.3–6.7)
NEUTROPHILS # BLD AUTO: 15.31 X10(3) UL (ref 1.3–6.7)
NEUTROPHILS NFR BLD AUTO: 84.6 %
PLATELET # BLD AUTO: 204 10(3)UL (ref 150–450)
POTASSIUM SERPL-SCNC: 4.1 MMOL/L (ref 3.6–5.1)
PSA SERPL DL<=0.01 NG/ML-MCNC: 13.8 SECONDS (ref 12.3–14.8)
RBC # BLD AUTO: 3.73 X10(6)UL (ref 3.8–5.1)
RED CELL DISTRIBUTION WIDTH-SD: 39.1 FL (ref 35.1–46.3)
SODIUM SERPL-SCNC: 141 MMOL/L (ref 136–144)
WBC # BLD AUTO: 18.1 X10(3) UL (ref 4–13)

## 2017-01-25 PROCEDURE — 99232 SBSQ HOSP IP/OBS MODERATE 35: CPT | Performed by: OTHER

## 2017-01-25 RX ORDER — ACETAMINOPHEN 325 MG/1
650 TABLET ORAL EVERY 6 HOURS PRN
Status: DISCONTINUED | OUTPATIENT
Start: 2017-01-25 | End: 2017-01-26

## 2017-01-25 RX ORDER — SENNOSIDES 8.6 MG
8.6 TABLET ORAL 2 TIMES DAILY
Status: DISCONTINUED | OUTPATIENT
Start: 2017-01-25 | End: 2017-02-01

## 2017-01-25 NOTE — PROGRESS NOTES
[facility]  Neuro Critical Care Progress Note    Kassandra Mars Patient Status:  Inpatient    1975 MRN [id number]   [facility] 6NE-A Attending Simone Mercer, DO   Hosp Day # 1 PCP None Pcp     Subjective:  Headache is much improve BID  •  ondansetron HCl (ZOFRAN) injection 4 mg, 4 mg, Intravenous, Q6H PRN  •  TraMADol HCl (ULTRAM) tab  mg,  mg, Oral, Q6H PRN  •  fentaNYL citrate (SUBLIMAZE) 0.05 MG/ML injection  mcg,  mcg, Intravenous, Q1H PRN  •  influenza v

## 2017-01-25 NOTE — PAYOR COMM NOTE
Attending Physician: Yordan Taylor DO    Review Type: ADMISSION   Reviewer: Toño Govea       Date: January 25, 2017 - 9:49 AM  Payor: [facility]  Authorization Number: N/A  Admit date: 1/24/2017  5:21 AM   Admitted from Emergency Dept ondansetron HCl (ZOFRAN) injection 4 mg     Date Action Dose Route User    1/24/2017 1222 Given 4 mg Intravenous Lui Reid RN      Pantoprazole Sodium (PROTONIX) EC tab 20 mg     Date Action Dose Route User    1/25/2017 0615 Given 20 mg Oral G following:     WBC 18.1 (*)     RBC 3.73 (*)     HGB 11.0 (*)     HCT 33.0 (*)     Neutrophil Absolute Prelim 15.31 (*)     Neutrophil Absolute 15.31 (*)     Monocyte Absolute 0.99 (*)     All other components within normal limits   MAGNESIUM - Normal   PT

## 2017-01-25 NOTE — PLAN OF CARE
Assumed care of Jailyn Reyes @approximately 1915 asleep, but easily arousable, flat affect, pleasant, and cooperative with care. Q2H neuro checks and intact. Nausea resolved and Dania ate some fruit, pudding and a soda with no N/V.  Pain controlled with tramadol

## 2017-01-25 NOTE — PROGRESS NOTES
[address] [facility] Progress Note    Rhiannon Murray Patient Status:  Inpatient    1975 MRN [id number]   [facility] 6NE-A Attending Yordan Taylor, DO   Hosp Day # 1 PCP None Pcp     Subjective:  Rhiannon Murray is a(n) 35 year mmHg  3. Pain control  4. OOB to chair for all meals  5. Ambulate QID  6. Antiemetics prn  7. Neuro CC following    Possible transfer out of the ICU later today.   Dr. Edin Mena to follow      Shivam Capps, APN  [address]  Pager

## 2017-01-26 ENCOUNTER — APPOINTMENT (OUTPATIENT)
Dept: INTERVENTIONAL RADIOLOGY/VASCULAR | Facility: HOSPITAL | Age: 42
DRG: 025 | End: 2017-01-26
Attending: PHYSICIAN ASSISTANT
Payer: MEDICARE

## 2017-01-26 ENCOUNTER — APPOINTMENT (OUTPATIENT)
Dept: ULTRASOUND IMAGING | Facility: HOSPITAL | Age: 42
DRG: 025 | End: 2017-01-26
Attending: CLINICAL NURSE SPECIALIST
Payer: MEDICARE

## 2017-01-26 ENCOUNTER — APPOINTMENT (OUTPATIENT)
Dept: CT IMAGING | Facility: HOSPITAL | Age: 42
DRG: 025 | End: 2017-01-26
Attending: NEUROLOGICAL SURGERY
Payer: MEDICARE

## 2017-01-26 LAB
ALBUMIN SERPL-MCNC: 3.3 G/DL (ref 3.5–4.8)
ALP LIVER SERPL-CCNC: 206 U/L (ref 37–98)
ALT SERPL-CCNC: 597 U/L (ref 14–54)
APTT PPP: 33.6 SECONDS (ref 25–34)
AST SERPL-CCNC: 483 U/L (ref 15–41)
ATRIAL RATE: 83 BPM
BASOPHILS # BLD AUTO: 0.09 X10(3) UL (ref 0–0.1)
BASOPHILS # BLD AUTO: 0.12 X10(3) UL (ref 0–0.1)
BASOPHILS NFR BLD AUTO: 0.9 %
BASOPHILS NFR BLD AUTO: 1.6 %
BILIRUB SERPL-MCNC: 1.4 MG/DL (ref 0.1–2)
BUN BLD-MCNC: 10 MG/DL (ref 8–20)
BUN BLD-MCNC: 10 MG/DL (ref 8–20)
CALCIUM BLD-MCNC: 8.3 MG/DL (ref 8.3–10.3)
CALCIUM BLD-MCNC: 8.3 MG/DL (ref 8.3–10.3)
CHLORIDE: 106 MMOL/L (ref 101–111)
CHLORIDE: 106 MMOL/L (ref 101–111)
CK: 41 IU/L (ref 26–192)
CO2: 26 MMOL/L (ref 22–32)
CO2: 29 MMOL/L (ref 22–32)
CREAT BLD-MCNC: 0.68 MG/DL (ref 0.55–1.02)
CREAT BLD-MCNC: 0.73 MG/DL (ref 0.55–1.02)
EOSINOPHIL # BLD AUTO: 0.14 X10(3) UL (ref 0–0.3)
EOSINOPHIL # BLD AUTO: 0.15 X10(3) UL (ref 0–0.3)
EOSINOPHIL NFR BLD AUTO: 1.5 %
EOSINOPHIL NFR BLD AUTO: 1.8 %
ERYTHROCYTE [DISTWIDTH] IN BLOOD BY AUTOMATED COUNT: 12.4 % (ref 11.5–16)
ERYTHROCYTE [DISTWIDTH] IN BLOOD BY AUTOMATED COUNT: 12.6 % (ref 11.5–16)
GLUCOSE BLD-MCNC: 118 MG/DL (ref 70–99)
GLUCOSE BLD-MCNC: 98 MG/DL (ref 70–99)
HAV IGM SER QL: 2 MG/DL (ref 1.7–3)
HAV IGM SER QL: 2 MG/DL (ref 1.7–3)
HAV IGM SER QL: NONREACTIVE
HBV CORE IGM SER QL: NONREACTIVE
HBV SURFACE AG SERPL QL IA: NONREACTIVE
HCT VFR BLD AUTO: 31.4 % (ref 34–50)
HCT VFR BLD AUTO: 36.4 % (ref 34–50)
HEPATITIS C VIRUS AB INTERPRETATION: NONREACTIVE
HGB BLD-MCNC: 10 G/DL (ref 12–16)
HGB BLD-MCNC: 11.7 G/DL (ref 12–16)
IMMATURE GRANULOCYTE COUNT: 0.11 X10(3) UL (ref 0–1)
IMMATURE GRANULOCYTE COUNT: 0.12 X10(3) UL (ref 0–1)
IMMATURE GRANULOCYTE RATIO %: 1.2 %
IMMATURE GRANULOCYTE RATIO %: 1.4 %
INR BLD: 1.02 (ref 0.89–1.12)
LYMPHOCYTES # BLD AUTO: 2.49 X10(3) UL (ref 0.9–4)
LYMPHOCYTES # BLD AUTO: 3.29 X10(3) UL (ref 0.9–4)
LYMPHOCYTES NFR BLD AUTO: 32.4 %
LYMPHOCYTES NFR BLD AUTO: 32.4 %
M PROTEIN MFR SERPL ELPH: 7 G/DL (ref 6.1–8.3)
MCH RBC QN AUTO: 29.6 PG (ref 27–33.2)
MCH RBC QN AUTO: 29.8 PG (ref 27–33.2)
MCHC RBC AUTO-ENTMCNC: 31.8 G/DL (ref 31–37)
MCHC RBC AUTO-ENTMCNC: 32.1 G/DL (ref 31–37)
MCV RBC AUTO: 92.9 FL (ref 81–100)
MCV RBC AUTO: 92.9 FL (ref 81–100)
MONOCYTES # BLD AUTO: 0.35 X10(3) UL (ref 0.1–0.6)
MONOCYTES # BLD AUTO: 0.59 X10(3) UL (ref 0.1–0.6)
MONOCYTES NFR BLD AUTO: 4.6 %
MONOCYTES NFR BLD AUTO: 5.8 %
NEUTROPHIL ABS PRELIM: 4.47 X10 (3) UL (ref 1.3–6.7)
NEUTROPHIL ABS PRELIM: 5.93 X10 (3) UL (ref 1.3–6.7)
NEUTROPHILS # BLD AUTO: 4.47 X10(3) UL (ref 1.3–6.7)
NEUTROPHILS # BLD AUTO: 5.93 X10(3) UL (ref 1.3–6.7)
NEUTROPHILS NFR BLD AUTO: 58.2 %
NEUTROPHILS NFR BLD AUTO: 58.2 %
P AXIS: 55 DEGREES
P-R INTERVAL: 140 MS
PHOSPHATE SERPL-MCNC: 1.9 MG/DL (ref 2.5–4.9)
PLATELET # BLD AUTO: 184 10(3)UL (ref 150–450)
PLATELET # BLD AUTO: 186 10(3)UL (ref 150–450)
POTASSIUM SERPL-SCNC: 3.6 MMOL/L (ref 3.6–5.1)
POTASSIUM SERPL-SCNC: 4.2 MMOL/L (ref 3.6–5.1)
PSA SERPL DL<=0.01 NG/ML-MCNC: 13.7 SECONDS (ref 12.3–14.8)
Q-T INTERVAL: 366 MS
QRS DURATION: 76 MS
QTC CALCULATION (BEZET): 430 MS
R AXIS: 53 DEGREES
RBC # BLD AUTO: 3.38 X10(6)UL (ref 3.8–5.1)
RBC # BLD AUTO: 3.92 X10(6)UL (ref 3.8–5.1)
RED CELL DISTRIBUTION WIDTH-SD: 42.1 FL (ref 35.1–46.3)
RED CELL DISTRIBUTION WIDTH-SD: 43 FL (ref 35.1–46.3)
SODIUM SERPL-SCNC: 139 MMOL/L (ref 136–144)
SODIUM SERPL-SCNC: 141 MMOL/L (ref 136–144)
T AXIS: 55 DEGREES
TROPONIN: <0.046 NG/ML (ref ?–0.05)
VENTRICULAR RATE: 83 BPM
WBC # BLD AUTO: 10.2 X10(3) UL (ref 4–13)
WBC # BLD AUTO: 7.7 X10(3) UL (ref 4–13)

## 2017-01-26 PROCEDURE — 76700 US EXAM ABDOM COMPLETE: CPT

## 2017-01-26 PROCEDURE — 99233 SBSQ HOSP IP/OBS HIGH 50: CPT | Performed by: OTHER

## 2017-01-26 PROCEDURE — 009U30Z DRAINAGE OF SPINAL CANAL WITH DRAINAGE DEVICE, PERCUTANEOUS APPROACH: ICD-10-PCS | Performed by: RADIOLOGY

## 2017-01-26 PROCEDURE — 70450 CT HEAD/BRAIN W/O DYE: CPT

## 2017-01-26 PROCEDURE — B01B1ZZ FLUOROSCOPY OF SPINAL CORD USING LOW OSMOLAR CONTRAST: ICD-10-PCS | Performed by: RADIOLOGY

## 2017-01-26 RX ORDER — MIDAZOLAM HYDROCHLORIDE 1 MG/ML
INJECTION INTRAMUSCULAR; INTRAVENOUS
Status: COMPLETED
Start: 2017-01-26 | End: 2017-01-26

## 2017-01-26 RX ORDER — ONDANSETRON 2 MG/ML
INJECTION INTRAMUSCULAR; INTRAVENOUS
Status: COMPLETED
Start: 2017-01-26 | End: 2017-01-26

## 2017-01-26 RX ORDER — DIAZEPAM 5 MG/ML
2.5 INJECTION, SOLUTION INTRAMUSCULAR; INTRAVENOUS EVERY 6 HOURS PRN
Status: DISCONTINUED | OUTPATIENT
Start: 2017-01-26 | End: 2017-01-30

## 2017-01-26 RX ORDER — SODIUM CHLORIDE 9 MG/ML
INJECTION, SOLUTION INTRAVENOUS ONCE
Status: COMPLETED | OUTPATIENT
Start: 2017-01-26 | End: 2017-01-26

## 2017-01-26 RX ORDER — DEXAMETHASONE SODIUM PHOSPHATE 4 MG/ML
6 VIAL (ML) INJECTION ONCE
Status: DISCONTINUED | OUTPATIENT
Start: 2017-01-26 | End: 2017-01-26

## 2017-01-26 RX ORDER — LIDOCAINE HYDROCHLORIDE 10 MG/ML
INJECTION, SOLUTION INFILTRATION; PERINEURAL
Status: COMPLETED
Start: 2017-01-26 | End: 2017-01-26

## 2017-01-26 RX ORDER — BUTALBITAL, ASPIRIN, AND CAFFEINE 50; 325; 40 MG/1; MG/1; MG/1
1 CAPSULE ORAL EVERY 4 HOURS PRN
Status: DISCONTINUED | OUTPATIENT
Start: 2017-01-26 | End: 2017-01-27

## 2017-01-26 RX ORDER — METOCLOPRAMIDE 10 MG/1
10 TABLET ORAL EVERY 6 HOURS PRN
Status: DISCONTINUED | OUTPATIENT
Start: 2017-01-26 | End: 2017-01-26

## 2017-01-26 RX ORDER — ONDANSETRON 2 MG/ML
4 INJECTION INTRAMUSCULAR; INTRAVENOUS EVERY 4 HOURS PRN
Status: DISCONTINUED | OUTPATIENT
Start: 2017-01-26 | End: 2017-02-01

## 2017-01-26 RX ORDER — DIAZEPAM 5 MG/ML
2.5 INJECTION, SOLUTION INTRAMUSCULAR; INTRAVENOUS ONCE
Status: DISCONTINUED | OUTPATIENT
Start: 2017-01-26 | End: 2017-01-30

## 2017-01-26 RX ORDER — BUTALBITAL, ASPIRIN, AND CAFFEINE 50; 325; 40 MG/1; MG/1; MG/1
1-2 CAPSULE ORAL EVERY 4 HOURS PRN
Status: DISCONTINUED | OUTPATIENT
Start: 2017-01-26 | End: 2017-01-26 | Stop reason: SDUPTHER

## 2017-01-26 RX ORDER — METOCLOPRAMIDE HYDROCHLORIDE 5 MG/ML
INJECTION INTRAMUSCULAR; INTRAVENOUS
Status: DISPENSED
Start: 2017-01-26 | End: 2017-01-27

## 2017-01-26 RX ORDER — METOCLOPRAMIDE HYDROCHLORIDE 5 MG/ML
10 INJECTION INTRAMUSCULAR; INTRAVENOUS EVERY 6 HOURS PRN
Status: DISCONTINUED | OUTPATIENT
Start: 2017-01-26 | End: 2017-02-01

## 2017-01-26 RX ORDER — BUTALBITAL, ASPIRIN, AND CAFFEINE 50; 325; 40 MG/1; MG/1; MG/1
2 CAPSULE ORAL EVERY 4 HOURS PRN
Status: DISCONTINUED | OUTPATIENT
Start: 2017-01-26 | End: 2017-01-27

## 2017-01-26 NOTE — PROCEDURES
[facility]  Neurointerventional Surgery Operative Report  Sherrel Manual Patient Status:  Inpatient    1975 MRN [id number]   [facility] 6NE-A Attending Kvng Rodgers, DO   Hosp Day # 2 PCP None Pcp     Procedure: Fluoro guided Home Millan

## 2017-01-26 NOTE — PHYSICAL THERAPY NOTE
Order received for PT eval per fall risk protocol. RN reports patient has been up ambulating independently and does not require therapy at this time. Will D/C PT order. Please re-order therapy if change in status.

## 2017-01-26 NOTE — PROCEDURES
[facility]  Pre-Procedure Note  Rosanna Zee Patient Status:  Inpatient    1975 MRN [id number]   [facility] 6NE-A Attending Enoch Gunter, DO   Hosp Day # 2 PCP None Pcp     Pre-Op Diagnosis:  Post-operative CSF leak.  Lumbar

## 2017-01-26 NOTE — CM/SW NOTE
01/26/17 1100   CM/SW Referral Data   Referral Source Social Work (self-referral)   Reason for Referral Discharge planning   Informant Patient   Readmission Assessment   Factors that patient feels contributed to this readmission Other (only choose if no admissions. Pt states she is [facility]% [facility] connected if she had any needs she would go through [place]. SW contact info left with pt. SW to follow for dc needs.     Rita Maldonado, 01/26/2017, 11:25 AM

## 2017-01-26 NOTE — PROGRESS NOTES
[facility]  Neuro Critical Care Progress Note    Sherrel Manual Patient Status:  Inpatient    1975 MRN [id number]   [facility] 6NE-A Attending Kvng Rodgers, DO   Hosp Day # 2 PCP None Pcp     Subjective:  Headache is reasonably c mg, Intravenous, PRN  •  hydrALAzine HCl (APRESOLINE) injection 10 mg, 10 mg, Intravenous, Q1H PRN  •  docusate sodium (COLACE) cap 100 mg, 100 mg, Oral, BID  •  TraMADol HCl (ULTRAM) tab  mg,  mg, Oral, Q6H PRN  •  fentaNYL citrate (SUBLIMAZE)

## 2017-01-26 NOTE — BH PROGRESS NOTE
Went to see the pt for f/u with phq4. The nurse went into see if the pt was awake enough to talk to this liaison. She said, the pt states she is not interested in f/u with psychiatry.

## 2017-01-26 NOTE — CONSULTS
Physician Requesting Consult: Edin Mena  Primary Physician:     none  Date of Onset:                 CC: No chief complaint on file. HPI: Bandar Pena 35year old female with plugged left ear with pressure.   She is s/p left frontal craniotomy in Nov 2016 for (deferred)  Pulm: (deferred)  Procedure: None  DIAGNOSTIC TESTING  Audiology: None ordered      None  Radiology: None    ASSESSMENT & PLAN:  Left middle ear effusion. Difficult to tell if this is acute or longstanding.   She is uncertain if this symptom pr

## 2017-01-26 NOTE — PLAN OF CARE
Patient drowsy, oriented x4, follows all commands, strength equal. States has mild hearing difficulty in left ear and mild blurred vision in left eye.  She states she had the blurred vision prior to arrival. She has swelling to her left forehead, orbit, and

## 2017-01-26 NOTE — PROGRESS NOTES
[facility]  Neurosurgery Progress Note    Maria Elena Lakhani Patient Status:  Inpatient    1975 MRN [id number]   [facility] 6NE-A Attending Ishan Lyon, DO   Hosp Day # 2 PCP None Pcp       Subjective:  Maria Elena Lakhani is a(n) 35 year o eye    Labs:    Lab Results  Component Value Date   WBC 10.2 01/26/2017   HGB 10.0 01/26/2017   HCT 31.4 01/26/2017   .0 01/26/2017   CREATSERUM 0.73 01/26/2017   BUN 10 01/26/2017    01/26/2017   K 4.2 01/26/2017    01/26/2017   CO2 29.

## 2017-01-27 LAB
ACETAMINOPHEN: 2.2 UG/ML (ref ?–2)
ALBUMIN SERPL-MCNC: 3.2 G/DL (ref 3.5–4.8)
ALP LIVER SERPL-CCNC: 270 U/L (ref 37–98)
ALT SERPL-CCNC: 848 U/L (ref 14–54)
AST SERPL-CCNC: 719 U/L (ref 15–41)
BASOPHIL CSF: 0 %
BASOPHILS # BLD AUTO: 0.09 X10(3) UL (ref 0–0.1)
BASOPHILS NFR BLD AUTO: 1 %
BILIRUB DIRECT SERPL-MCNC: 1.1 MG/DL (ref 0.1–0.5)
BILIRUB SERPL-MCNC: 2 MG/DL (ref 0.1–2)
BUN BLD-MCNC: 7 MG/DL (ref 8–20)
CALCIUM BLD-MCNC: 8.5 MG/DL (ref 8.3–10.3)
CERULOPLASMIN: 23.7 MG/DL (ref 20–60)
CHLORIDE: 109 MMOL/L (ref 101–111)
CLARITY CSF: CLEAR
CO2: 26 MMOL/L (ref 22–32)
COLOR CSF: COLORLESS
CREAT BLD-MCNC: 0.61 MG/DL (ref 0.55–1.02)
DEPRECATED HBV CORE AB SER IA-ACNC: 314.1 NG/ML (ref 10–291)
EOSINOPHIL # BLD AUTO: 0.18 X10(3) UL (ref 0–0.3)
EOSINOPHIL NFR BLD AUTO: 2.1 %
EOSINOPHILS CSF: 0 %
ERYTHROCYTE [DISTWIDTH] IN BLOOD BY AUTOMATED COUNT: 12 % (ref 11.5–16)
FOLATE (FOLIC ACID), SERUM: 11.4 NG/ML (ref 8.7–24)
GLUCOSE BLD-MCNC: 97 MG/DL (ref 70–99)
GLUCOSE CSF: 54 MG/DL (ref 40–70)
HAV AB SERPL IA-ACNC: 1816 PG/ML (ref 193–986)
HCT VFR BLD AUTO: 34.2 % (ref 34–50)
HGB BLD-MCNC: 10.9 G/DL (ref 12–16)
IMMATURE GRANULOCYTE COUNT: 0.13 X10(3) UL (ref 0–1)
IMMATURE GRANULOCYTE RATIO %: 1.5 %
IRON SATURATION: 46 % (ref 13–45)
IRON: 146 UG/DL (ref 28–170)
LYMPHOCYTES # BLD AUTO: 2.71 X10(3) UL (ref 0.9–4)
LYMPHOCYTES CSF: 38 %
LYMPHOCYTES NFR BLD AUTO: 31 %
M PROTEIN MFR SERPL ELPH: 6.5 G/DL (ref 6.1–8.3)
MCH RBC QN AUTO: 29.3 PG (ref 27–33.2)
MCHC RBC AUTO-ENTMCNC: 31.9 G/DL (ref 31–37)
MCV RBC AUTO: 91.9 FL (ref 81–100)
MONOCYTES # BLD AUTO: 0.46 X10(3) UL (ref 0.1–0.6)
MONOCYTES NFR BLD AUTO: 5.3 %
MONOMACROPHAGES CSF: 4 %
NEUTROPHIL ABS PRELIM: 5.18 X10 (3) UL (ref 1.3–6.7)
NEUTROPHILS # BLD AUTO: 5.18 X10(3) UL (ref 1.3–6.7)
NEUTROPHILS CSF: 58 %
NEUTROPHILS NFR BLD AUTO: 59.1 %
PLATELET # BLD AUTO: 212 10(3)UL (ref 150–450)
POTASSIUM SERPL-SCNC: 4 MMOL/L (ref 3.6–5.1)
POTASSIUM SERPL-SCNC: 4 MMOL/L (ref 3.6–5.1)
RBC # BLD AUTO: 3.72 X10(6)UL (ref 3.8–5.1)
RBC CSF: 52 /MM3
RED CELL DISTRIBUTION WIDTH-SD: 40.5 FL (ref 35.1–46.3)
SODIUM SERPL-SCNC: 142 MMOL/L (ref 136–144)
TOTAL CELLS COUNTED: 100
TOTAL IRON BINDING CAPACITY: 316 UG/DL (ref 298–536)
TOTAL PROTEIN CSF: 113.8 MG/DL (ref 15–45)
TOTAL VOLUME CSF: 10 ML
TRANSFERRIN: 212 MG/DL (ref 200–360)
TRANSFERRIN: 212 MG/DL (ref 200–360)
WBC # BLD AUTO: 8.8 X10(3) UL (ref 4–13)
WBC # FLD MANUAL: 16 /MM3 (ref 0–5)

## 2017-01-27 PROCEDURE — 99233 SBSQ HOSP IP/OBS HIGH 50: CPT | Performed by: OTHER

## 2017-01-27 RX ORDER — CAFFEINE 200 MG
200 TABLET ORAL EVERY 4 HOURS PRN
Status: DISCONTINUED | OUTPATIENT
Start: 2017-01-27 | End: 2017-01-28

## 2017-01-27 RX ORDER — BUTALBITAL, ASPIRIN, AND CAFFEINE 50; 325; 40 MG/1; MG/1; MG/1
2 CAPSULE ORAL EVERY 6 HOURS PRN
Status: DISCONTINUED | OUTPATIENT
Start: 2017-01-27 | End: 2017-02-01

## 2017-01-27 RX ORDER — POLYETHYLENE GLYCOL 3350 17 G/17G
17 POWDER, FOR SOLUTION ORAL DAILY PRN
Status: DISCONTINUED | OUTPATIENT
Start: 2017-01-27 | End: 2017-02-01

## 2017-01-27 RX ORDER — BISACODYL 10 MG
10 SUPPOSITORY, RECTAL RECTAL
Status: DISCONTINUED | OUTPATIENT
Start: 2017-01-27 | End: 2017-02-01

## 2017-01-27 NOTE — PROGRESS NOTES
[address] [facility] Progress Note    Juanis Gentile Patient Status:  Inpatient    1975 MRN [id number]   [facility] 6NE-A Attending Ruth Love DO   Hosp Day # 3 PCP None Pcp     Subjective:  Juanis Gentile is a(n) 35 year Lumbar Drain placement POD #1  3. Hx left frontal WHO Grade I Meningioma, s/p Craniotomy 11/17/16 by Dr. Clemens Shone  4. Expected post-op pain  5. N & V - still with nausea, no vomiting  6. Elevated LFT's of unclear etiology - LFT's trended up this morning  1.  U

## 2017-01-27 NOTE — PROGRESS NOTES
[address] [facility] dosing service    Initial Pharmacokinetic Consult for Vancomycin Dosing     Luis Portillo is a 35year old female with a history of left sphenoid wing meningioma s/p resection in November.   On admission she had a CSF leak, now s/p repair and cra

## 2017-01-27 NOTE — PROGRESS NOTES
[facility]  Neuro Critical Care Progress Note    Reg Indra Patient Status:  Inpatient    1975 MRN [id number]   [facility] 6NE-A Attending Florentino Nevarez, DO   Hosp Day # 3 PCP None Pcp     Subjective:  Headache is reasonably c diazepam (VALIUM) injection 2.5 mg, 2.5 mg, Intravenous, Once  •  diazepam (VALIUM) injection 2.5 mg, 2.5 mg, Intravenous, Q6H PRN  •  Senna (SENOKOT) tab TABS 8.6 mg, 8.6 mg, Oral, BID  •  CeFAZolin Sodium (ANCEF/KEFZOL) IVPB premix 2 g, 2 g, Intravenous, appropriate.   -SBP goal<150 mm Hg  -On Scopolamine patch for nasuea    Checklist:  A:Pain reasonably well controlled  B:Not intubated  C:RASS 0  D:CAM ICU -  E:Ambulate today  F:No family at bedside  G:No dougherty or lines  Ppx:SCDs    Thank you for allowing

## 2017-01-27 NOTE — PLAN OF CARE
Assumed care of pt @ 0730. Pt increased to draining 15 per hour, decreased back down to 10 per Dr. Sukumar Murray. CSF fluid clear. Pt still having irretractable headache that gets worse off and on. Lower back pain also constant. Pt describes it as a \"spasm. \"

## 2017-01-28 LAB
ALBUMIN SERPL-MCNC: 3.5 G/DL (ref 3.5–4.8)
ALP LIVER SERPL-CCNC: 332 U/L (ref 37–98)
ALT SERPL-CCNC: 652 U/L (ref 14–54)
AST SERPL-CCNC: 317 U/L (ref 15–41)
BILIRUB SERPL-MCNC: 1 MG/DL (ref 0.1–2)
BLOOD TYPE BARCODE: 6200
BUN BLD-MCNC: 7 MG/DL (ref 8–20)
CALCIUM BLD-MCNC: 9 MG/DL (ref 8.3–10.3)
CHLORIDE: 105 MMOL/L (ref 101–111)
CO2: 28 MMOL/L (ref 22–32)
CREAT BLD-MCNC: 0.74 MG/DL (ref 0.55–1.02)
GLUCOSE BLD-MCNC: 102 MG/DL (ref 70–99)
INR BLD: 0.99 (ref 0.89–1.12)
M PROTEIN MFR SERPL ELPH: 7.3 G/DL (ref 6.1–8.3)
POTASSIUM SERPL-SCNC: 3.4 MMOL/L (ref 3.6–5.1)
POTASSIUM SERPL-SCNC: 4.4 MMOL/L (ref 3.6–5.1)
PSA SERPL DL<=0.01 NG/ML-MCNC: 13.4 SECONDS (ref 12.3–14.8)
SODIUM SERPL-SCNC: 139 MMOL/L (ref 136–144)

## 2017-01-28 PROCEDURE — 99232 SBSQ HOSP IP/OBS MODERATE 35: CPT | Performed by: OTHER

## 2017-01-28 RX ORDER — POTASSIUM CHLORIDE 20 MEQ/1
40 TABLET, EXTENDED RELEASE ORAL EVERY 4 HOURS
Status: COMPLETED | OUTPATIENT
Start: 2017-01-28 | End: 2017-01-28

## 2017-01-28 NOTE — PROGRESS NOTES
[facility]  Neuro Critical Care Progress Note    Kassandra Mars Patient Status:  Inpatient    1975 MRN [id number]   [facility] 6NE-A Attending Simone Mercer, DO   Hosp Day # 4 PCP None Pcp     Subjective:  Headache is reasonably c in sodium chloride 0.9 % 250 mL IVPB add-vantage, 15 mg/kg, Intravenous, Q12H  •  caffeine tab 200 mg, 200 mg, Oral, Q4H PRN  •  aspirin-butalbital-caffeine (FIORINAL) per cap 2 capsule, 2 capsule, Oral, Q6H PRN  •  ondansetron HCl (ZOFRAN) injection 4 mg, for pain (all managed by neurosurgery)  -GI consulted for elevated LFTs, Hepatitis panel negative. Liver US negative and numbers are trending down  -PT OT when appropriate.   -SBP goal<150 mm Hg  -On Scopolamine patch for nasuea    Checklist:  A:Pain reason

## 2017-01-28 NOTE — PLAN OF CARE
Assumed care of pt 1930. Pt drowsy and sleepy but able to answer all questions appropriately. Neuro checks Q4 are all intact. Pt states pain is controlled at 4/10. Pt up to restroom several times due to upset stomach and excessive gas. Gait steady.  10ML cs

## 2017-01-28 NOTE — PROGRESS NOTES
[id number] Elidia Villanueva Neurosurgery Progress Note    Wanda Canchola Patient Status:  Inpatient    1975 MRN [id number]   [facility] Attending Simona Regan, DO   Hosp Day # 4 PCP None Pcp     Subjective:  Wnada Canchola is a(n) 35 year LFT's trending down today  1. US negative    Plan:  1. Neuro checks Q4 hours  2. Keep SBP <150 mmHg  3. Lumbar drain - drain 10 ml/hr. Do not clamp after draining the hourly amount but instead raise  the drain and keep it higher than her head  4.  Pain con

## 2017-01-28 NOTE — PROGRESS NOTES
Gastroenterology Progress Note  S: Headache better today, some nausea  O: /67 mmHg  Pulse 90  Temp(Src) 99.2 °F (37.3 °C) (Tympanic)  Resp 16  Ht 170.2 cm (5' 7\")  Wt 159 lb 2.8 oz (72.2 kg)  BMI 24.92 kg/m2  SpO2 98%  LMP 11/14/2013  Gen: AAOx2  Ps

## 2017-01-29 LAB
ALBUMIN SERPL-MCNC: 3 G/DL (ref 3.5–4.8)
ALP LIVER SERPL-CCNC: 234 U/L (ref 37–98)
ALT SERPL-CCNC: 364 U/L (ref 14–54)
ANA SCREEN: NEGATIVE
AST SERPL-CCNC: 102 U/L (ref 15–41)
BILIRUB SERPL-MCNC: 0.4 MG/DL (ref 0.1–2)
BUN BLD-MCNC: 11 MG/DL (ref 8–20)
CALCIUM BLD-MCNC: 8.2 MG/DL (ref 8.3–10.3)
CHLORIDE: 107 MMOL/L (ref 101–111)
CO2: 27 MMOL/L (ref 22–32)
CREAT BLD-MCNC: 0.61 MG/DL (ref 0.55–1.02)
F-ACTIN (SMOOTH MUSCLE) AB: 16 UNITS
GLUCOSE BLD-MCNC: 90 MG/DL (ref 70–99)
M PROTEIN MFR SERPL ELPH: 6.4 G/DL (ref 6.1–8.3)
MITOCHONDRIAL M2 AB, IGG: 41.8 UNITS
POTASSIUM SERPL-SCNC: 4.2 MMOL/L (ref 3.6–5.1)
SODIUM SERPL-SCNC: 140 MMOL/L (ref 136–144)

## 2017-01-29 PROCEDURE — 99232 SBSQ HOSP IP/OBS MODERATE 35: CPT | Performed by: OTHER

## 2017-01-29 RX ORDER — SODIUM CHLORIDE 9 MG/ML
INJECTION, SOLUTION INTRAVENOUS CONTINUOUS
Status: DISCONTINUED | OUTPATIENT
Start: 2017-01-29 | End: 2017-01-31

## 2017-01-29 NOTE — PROGRESS NOTES
Gastroenterology Progress Note  S: Continues to feel better  O: BP 98/71 mmHg  Pulse 67  Temp(Src) 100.1 °F (37.8 °C) (Temporal)  Resp 15  Ht 170.2 cm (5' 7\")  Wt 159 lb 2.8 oz (72.2 kg)  BMI 24.92 kg/m2  SpO2 99%  LMP 11/14/2013  Gen: AAOx2  Psych-normal Gastroenterology  [phone]

## 2017-01-29 NOTE — PROGRESS NOTES
[facility]  Neuro Critical Care Progress Note    Rohit Freeman Patient Status:  Inpatient    1975 MRN [id number]   [facility] 6NE-A Attending Nagi Duran, DO   Hosp Day # 5 PCP None Pcp     Subjective:  Headache is better today ondansetron HCl (ZOFRAN) injection 4 mg, 4 mg, Intravenous, Q4H PRN  •  Metoclopramide HCl (REGLAN) injection 10 mg, 10 mg, Intravenous, Q6H PRN  •  diazepam (VALIUM) injection 2.5 mg, 2.5 mg, Intravenous, Once  •  diazepam (VALIUM) injection 2.5 mg, 2.5 m ICU -  E:Ambulate today  F:No family at bedside  G:No dougherty or lines  Ppx:SCDs    Thank you for allowing me to participate in the care of this patient.     Ag Jimenez MD  Neurocritical care  [facility]  Pager: [phone]  01/29/2

## 2017-01-29 NOTE — PROGRESS NOTES
[facility] dosing service    Follow-up Pharmacokinetic Consult for Vancomycin Dosing     Maria Elena Lakhani is a 35year old female who is being treated for prophylaxis for lumbar drain. Patient is on day 3 of Vancomycin 1 gm IV Q 12 hours.   Goal trough is ~15

## 2017-01-29 NOTE — PROGRESS NOTES
Called with hypotension. Patient with minimal oral intake per RN, and no IVF currently. 500 ml NS bolus ordered with initiation of maintenance IVF of NS @ 75 ml/h.   Rn to updated Neurosurgery    GEE Pressley  Critical Care NP  [facility]

## 2017-01-29 NOTE — PLAN OF CARE
Assumed care of pt at 0730. Lumbar drain intact and draining 10ml CSF q 1 Hr. PT tolerates drainage until aprox 8ml, then for the last 2 ml begins c/o H/A. H/A responds well to fentanyl and fiorinal. PT tolerating regular diet without nausea or vomiting.  Camron Renee

## 2017-01-29 NOTE — PLAN OF CARE
Received pt into care 1930. Pt stable and sleeping with complaints of light headache but declined medication. 10mls drained from lumbar drain Q 1 hr per physician order.  Pt had a couple episodes of hypotension during deep sleep that resolved itself with pt

## 2017-01-29 NOTE — PROGRESS NOTES
[id number] Elidia Villanueva Neurosurgery Progress Note    Bandar Pena Patient Status:  Inpatient    1975 MRN [id number]   [facility] 6NE-A Attending Lisandro Diego, DO   Hosp Day # 5 PCP None Pcp     Subjective:  Bandar Pena is a(n) 35 year the drain and keep it higher than her head  4. Pain control  5. OOB to chair for all meals  6. Ambulate QID  7. Antiemetics prn  8. Neuro CC & GI following  9. Bowel regimen  10. Maintenance fluids  11. Check CMP this morning  12.  Recommend not using Fenta

## 2017-01-29 NOTE — PLAN OF CARE
Assumed care of pt at 0730. Pt rating HA pain 4-5/10, taking Fiorinol for H/A pain. Pt stated Butt pain was about 7/10 with some numbness down her R leg that seemed to get worse by aboutt 1300. Dr Ema Haque to report increased R leg numbness.  Orders

## 2017-01-30 LAB — VANCOMYCIN TROUGH: 8.1 UG/ML (ref 10–20)

## 2017-01-30 PROCEDURE — 99233 SBSQ HOSP IP/OBS HIGH 50: CPT | Performed by: OTHER

## 2017-01-30 RX ORDER — GABAPENTIN 100 MG/1
100 CAPSULE ORAL 3 TIMES DAILY
Status: DISCONTINUED | OUTPATIENT
Start: 2017-01-30 | End: 2017-02-01

## 2017-01-30 RX ORDER — DIAZEPAM 5 MG/1
5 TABLET ORAL EVERY 6 HOURS PRN
Status: DISCONTINUED | OUTPATIENT
Start: 2017-01-30 | End: 2017-02-01

## 2017-01-30 RX ORDER — DIAZEPAM 2 MG/1
2 TABLET ORAL EVERY 6 HOURS PRN
Status: DISCONTINUED | OUTPATIENT
Start: 2017-01-30 | End: 2017-01-30

## 2017-01-30 NOTE — PROGRESS NOTES
[facility]  Neuro Critical Care Progress Note    Aaron Kody Patient Status:  Inpatient    1975 MRN [id number]   [facility] 6NE-A Attending Marcy Martinez, DO   Hosp Day # 6 PCP None Pcp     Subjective:  Headache is better today Intravenous, Q12H  •  aspirin-butalbital-caffeine (FIORINAL) per cap 2 capsule, 2 capsule, Oral, Q6H PRN  •  ondansetron HCl (ZOFRAN) injection 4 mg, 4 mg, Intravenous, Q4H PRN  •  Metoclopramide HCl (REGLAN) injection 10 mg, 10 mg, Intravenous, Q6H PRN  • [id number] [facility]  Board Certified in Neurology, Vascular Neurology(Stroke), Neurocritical Care and Headache Medicine.

## 2017-01-30 NOTE — PROGRESS NOTES
[id number] Elidia Villanueva Neurosurgery Progress Note    Juanis Gentile Patient Status:  Inpatient    1975 MRN [id number]   [facility] 6NE-A Attending Ruth Love,    Hosp Day # 6 PCP None Pcp     Subjective:  Juanis Gentile is a(n) 35 year it higher than her head  4. Pain control  5. OOB to chair for all meals  6. Ambulate QID  7. Antiemetics prn  8. Neuro CC & GI following  9. Bowel regimen  10.  DC Fentanyl    CPM  Dr. Rodrigo Olivarez to follow      GEE Harvey  Spe

## 2017-01-30 NOTE — PROGRESS NOTES
120 [facility] dosing service    Follow-up Pharmacokinetic Consult for Vancomycin Dosing     Reg Burrell is a 35year old female who is being treated for prophylaxis for lumbar drain. Patient is on day 4 of Vancomycin 1 gm IV Q 12 hours.   Goal trough is 10-

## 2017-01-31 ENCOUNTER — ANESTHESIA EVENT (OUTPATIENT)
Dept: SURGERY | Facility: HOSPITAL | Age: 42
DRG: 025 | End: 2017-01-31
Payer: MEDICARE

## 2017-01-31 LAB
ALPHA-1-ANTITRYPSIN: 148 MG/DL
BAND %: 1 %
BASOPHIL % MANUAL: 0 %
BASOPHIL ABSOLUTE MANUAL: 0 X10(3) UL (ref 0–0.1)
BASOPHIL CSF: 0 %
BUN BLD-MCNC: 9 MG/DL (ref 8–20)
CALCIUM BLD-MCNC: 8.2 MG/DL (ref 8.3–10.3)
CHLORIDE: 109 MMOL/L (ref 101–111)
CLARITY CSF: CLEAR
CO2: 27 MMOL/L (ref 22–32)
COLOR CSF: COLORLESS
CREAT BLD-MCNC: 0.65 MG/DL (ref 0.55–1.02)
EOSINOPHIL % MANUAL: 7 %
EOSINOPHIL ABSOLUTE MANUAL: 0.53 X10(3) UL (ref 0–0.3)
EOSINOPHILS CSF: 0 %
ERYTHROCYTE [DISTWIDTH] IN BLOOD BY AUTOMATED COUNT: 12.4 % (ref 11.5–16)
GLUCOSE BLD-MCNC: 96 MG/DL (ref 70–99)
GLUCOSE CSF: 47 MG/DL (ref 40–70)
HAV IGM SER QL: 1.9 MG/DL (ref 1.7–3)
HCT VFR BLD AUTO: 31.3 % (ref 34–50)
HGB BLD-MCNC: 10.3 G/DL (ref 12–16)
LYMPHOCYTE % MANUAL: 35 %
LYMPHOCYTE ABSOLUTE MANUAL: 2.66 X10(3) UL (ref 0.9–4)
LYMPHOCYTES CSF: 89 %
MCH RBC QN AUTO: 29.5 PG (ref 27–33.2)
MCHC RBC AUTO-ENTMCNC: 32.9 G/DL (ref 31–37)
MCV RBC AUTO: 89.7 FL (ref 81–100)
METAMYELOCYTE %: 1 %
METAMYELOCYTE ABSOLUTE MANUAL: 0.08 X10(3) UL (ref ?–0.01)
MONOCYTE % MANUAL: 3 %
MONOCYTE ABSOLUTE MANUAL: 0.23 X10(3) UL (ref 0.1–0.6)
MONOMACROPHAGES CSF: 10 %
MORPHOLOGY: NORMAL
NEUTROPHIL ABS PRELIM: 3.47 X10 (3) UL (ref 1.3–6.7)
NEUTROPHIL ABSOLUTE MANUAL: 4.1 X10(3) UL (ref 1.3–6.7)
NEUTROPHILS % MANUAL: 53 %
NEUTROPHILS CSF: 1 %
PHOSPHATE SERPL-MCNC: 3 MG/DL (ref 2.5–4.9)
PLATELET # BLD AUTO: 203 10(3)UL (ref 150–450)
PLATELET MORPHOLOGY: NORMAL
POTASSIUM SERPL-SCNC: 4.2 MMOL/L (ref 3.6–5.1)
RBC # BLD AUTO: 3.49 X10(6)UL (ref 3.8–5.1)
RBC CSF: 8 /MM3
RED CELL DISTRIBUTION WIDTH-SD: 40.1 FL (ref 35.1–46.3)
SODIUM SERPL-SCNC: 141 MMOL/L (ref 136–144)
TOTAL CELLS COUNTED: 100
TOTAL CELLS COUNTED: 70
TOTAL PROTEIN CSF: 70.9 MG/DL (ref 15–45)
TOTAL VOLUME CSF: 5 ML
WBC # BLD AUTO: 7.6 X10(3) UL (ref 4–13)
WBC # FLD MANUAL: 15 /MM3 (ref 0–5)

## 2017-01-31 PROCEDURE — 99233 SBSQ HOSP IP/OBS HIGH 50: CPT | Performed by: OTHER

## 2017-01-31 RX ORDER — GABAPENTIN 100 MG/1
100 CAPSULE ORAL 3 TIMES DAILY
Qty: 90 CAPSULE | Refills: 0 | Status: SHIPPED | OUTPATIENT
Start: 2017-01-31 | End: 2017-02-01

## 2017-01-31 RX ORDER — TRAMADOL HYDROCHLORIDE 50 MG/1
TABLET ORAL EVERY 6 HOURS PRN
Qty: 40 TABLET | Refills: 0 | Status: SHIPPED | OUTPATIENT
Start: 2017-01-31

## 2017-01-31 NOTE — PHYSICAL THERAPY NOTE
Order received for PT eval. Pt currently up in room ad patsy. Discussed with RN and patient with no therapy needs at this time. Will D/C PT.

## 2017-01-31 NOTE — PROGRESS NOTES
[facility]  Neurosurgery Progress Note    Luiz Roa Patient Status:  Inpatient    1975 MRN [id number]   [facility] 6NE-A Attending Rach Earl, DO   Hosp Day # 7 PCP None Pcp       Subjective:  Luiz Roa is a 35year old drain - clamped yesterday  4. Continue Vanco  5. Pain control  6. OOB to chair for all meals  7. Ambulate QID  8. Antiemetics prn  9. Neuro CC & GI following  10. Bowel regimen  11.  DC Fentanyl      Arina Muñoz, Mariaa [facility] [address]  1/31/ at 11 AM.  Since the patient has had previous abdominal surgery we have asked general surgery to assist in the abdominal portion of the ventriculoperitoneal shunt. Myrtle Velasquez

## 2017-01-31 NOTE — OCCUPATIONAL THERAPY NOTE
Order received for OT eval per RN patient has been up ambulating independently and completing all self-care independently and does not require therapy at this time. Will D/C OT order. RN also reports pt is going home today.   Please re-order therapy if calhoun

## 2017-01-31 NOTE — CM/SW NOTE
Per RN, the patient will be discharged to home later today with no dc planning needs.     Hanna Lemus LCSW

## 2017-01-31 NOTE — PROGRESS NOTES
[facility]  Neuro Critical Care Progress Note    Babita Costa Patient Status:  Inpatient    1975 MRN [id number]   [facility] 6NE-A Attending Renae Villegas, DO   Hosp Day # 7 PCP None Pcp     Subjective:  Headache is better today rectal suppository 10 mg, 10 mg, Rectal, Daily PRN  •  aspirin-butalbital-caffeine (FIORINAL) per cap 2 capsule, 2 capsule, Oral, Q6H PRN  •  ondansetron HCl (ZOFRAN) injection 4 mg, 4 mg, Intravenous, Q4H PRN  •  Metoclopramide HCl (REGLAN) injection 10 m intubated  C:RASS 0  D:CAM ICU -  E:Ambulate today  F:No family at bedside  G:No dougherty or lines  Ppx:SCDs    Possible d/c home today as per Neurosurgery.     Thank you for allowing me to take care of this patient,    Shara Pearson MD  Medical Director - Shaista Cardenas

## 2017-01-31 NOTE — PLAN OF CARE
Assumed care of Juliann Carter @approximately 5312 awake, alert, oriented, flat affect, but pleasant, and cooperative with care. Q4h vitals and neuro checks: intact; Juliann Carter reports blurred vision appears to be resolving.  Sinus rhythm/sinus vinod on the monitor HR

## 2017-01-31 NOTE — PROGRESS NOTES
Pt ambulated in halls x 2 today. Gait steady. Swelling at Left cheek same, no swelling noted in temporal area. Denies c/o hearing issues. Neuro intact.

## 2017-01-31 NOTE — PROGRESS NOTES
01/31/17 1702   Clinical Encounter Type   Visited With Patient   Routine Visit Introduction   Continue Visiting No   Patient's Supportive Strategies/Resources (Lawernce Serum visited with patient, providing prayer, Scripture, support and Sacrament of the [address])

## 2017-01-31 NOTE — ANESTHESIA PREPROCEDURE EVALUATION
PRE-OP EVALUATION    Patient Name: Andriy Alonso    Pre-op Diagnosis: INPT    Procedure(s):   SHUNT WITH LAPAROSCOPIC ASSISTANCE      Surgeon(s) and Role:  Panel 1:     Gelacio Ferreira DO - Primary    Panel 2:     Alley Mendoza MD - Primary    Pre- injection      [DISCONTINUED] Metoclopramide HCl (REGLAN) tab 10 mg 10 mg Oral Q6H PRN   [] Metoclopramide HCl (REGLAN) 5 MG/ML injection      Metoclopramide HCl (REGLAN) injection 10 mg 10 mg Intravenous Q6H PRN   [DISCONTINUED] dexamethasone Sodiu 4 mg 4 mg Intravenous PRN   [] Metoclopramide HCl (REGLAN) injection 10 mg 10 mg Intravenous PRN   [DISCONTINUED] bacitracin (BACIIM) injection   PRN   [DISCONTINUED] thrombin (THROMBIN-JMI) 30787 UNITS powder   PRN   [DISCONTINUED] bacitracin (baci every 6 (six) hours as needed. Disp: 40 tablet Rfl: 0   acetaminophen 325 MG Oral Tab Take 325 mg by mouth every 6 (six) hours as needed for Pain.  Disp:  Rfl:    omeprazole 20 MG Oral Capsule Delayed Release Take 20 mg by mouth 2 (two) times daily before m 01/31/2017   RDW 12.4 01/31/2017   .0 01/31/2017       Lab Results  Component Value Date    01/31/2017   K 4.2 01/31/2017    01/31/2017   CO2 27.0 01/31/2017   BUN 9 01/31/2017   CREATSERUM 0.65 01/31/2017   GLU 96 01/31/2017   CA 8.2* 0

## 2017-02-01 ENCOUNTER — ANESTHESIA (OUTPATIENT)
Dept: SURGERY | Facility: HOSPITAL | Age: 42
DRG: 025 | End: 2017-02-01
Payer: MEDICARE

## 2017-02-01 ENCOUNTER — APPOINTMENT (OUTPATIENT)
Dept: CT IMAGING | Facility: HOSPITAL | Age: 42
DRG: 025 | End: 2017-02-01
Attending: NURSE PRACTITIONER
Payer: MEDICARE

## 2017-02-01 ENCOUNTER — SURGERY (OUTPATIENT)
Age: 42
End: 2017-02-01

## 2017-02-01 VITALS
WEIGHT: 159.19 LBS | OXYGEN SATURATION: 99 % | RESPIRATION RATE: 23 BRPM | BODY MASS INDEX: 24.99 KG/M2 | SYSTOLIC BLOOD PRESSURE: 105 MMHG | TEMPERATURE: 98 F | HEIGHT: 67 IN | DIASTOLIC BLOOD PRESSURE: 59 MMHG | HEART RATE: 87 BPM

## 2017-02-01 PROCEDURE — 70450 CT HEAD/BRAIN W/O DYE: CPT

## 2017-02-01 PROCEDURE — 99233 SBSQ HOSP IP/OBS HIGH 50: CPT | Performed by: OTHER

## 2017-02-01 PROCEDURE — 00J03ZZ INSPECTION OF BRAIN, PERCUTANEOUS APPROACH: ICD-10-PCS | Performed by: NEUROLOGICAL SURGERY

## 2017-02-01 RX ORDER — SODIUM CHLORIDE, SODIUM LACTATE, POTASSIUM CHLORIDE, CALCIUM CHLORIDE 600; 310; 30; 20 MG/100ML; MG/100ML; MG/100ML; MG/100ML
INJECTION, SOLUTION INTRAVENOUS CONTINUOUS
Status: DISCONTINUED | OUTPATIENT
Start: 2017-02-01 | End: 2017-02-01

## 2017-02-01 RX ORDER — HYDROMORPHONE HYDROCHLORIDE 1 MG/ML
INJECTION, SOLUTION INTRAMUSCULAR; INTRAVENOUS; SUBCUTANEOUS
Status: COMPLETED
Start: 2017-02-01 | End: 2017-02-01

## 2017-02-01 RX ORDER — METOCLOPRAMIDE HYDROCHLORIDE 5 MG/ML
10 INJECTION INTRAMUSCULAR; INTRAVENOUS AS NEEDED
Status: DISCONTINUED | OUTPATIENT
Start: 2017-02-01 | End: 2017-02-01 | Stop reason: HOSPADM

## 2017-02-01 RX ORDER — NALOXONE HYDROCHLORIDE 0.4 MG/ML
80 INJECTION, SOLUTION INTRAMUSCULAR; INTRAVENOUS; SUBCUTANEOUS AS NEEDED
Status: DISCONTINUED | OUTPATIENT
Start: 2017-02-01 | End: 2017-02-01 | Stop reason: HOSPADM

## 2017-02-01 RX ORDER — TRAMADOL HYDROCHLORIDE 50 MG/1
TABLET ORAL EVERY 8 HOURS PRN
Qty: 40 TABLET | Refills: 0 | Status: SHIPPED | OUTPATIENT
Start: 2017-02-01 | End: 2017-02-13

## 2017-02-01 RX ORDER — MEPERIDINE HYDROCHLORIDE 25 MG/ML
INJECTION INTRAMUSCULAR; INTRAVENOUS; SUBCUTANEOUS
Status: COMPLETED
Start: 2017-02-01 | End: 2017-02-01

## 2017-02-01 RX ORDER — HYDROMORPHONE HYDROCHLORIDE 1 MG/ML
0.4 INJECTION, SOLUTION INTRAMUSCULAR; INTRAVENOUS; SUBCUTANEOUS EVERY 5 MIN PRN
Status: DISCONTINUED | OUTPATIENT
Start: 2017-02-01 | End: 2017-02-01 | Stop reason: HOSPADM

## 2017-02-01 RX ORDER — ONDANSETRON 2 MG/ML
4 INJECTION INTRAMUSCULAR; INTRAVENOUS AS NEEDED
Status: DISCONTINUED | OUTPATIENT
Start: 2017-02-01 | End: 2017-02-01 | Stop reason: HOSPADM

## 2017-02-01 RX ORDER — MEPERIDINE HYDROCHLORIDE 25 MG/ML
12.5 INJECTION INTRAMUSCULAR; INTRAVENOUS; SUBCUTANEOUS AS NEEDED
Status: DISCONTINUED | OUTPATIENT
Start: 2017-02-01 | End: 2017-02-01 | Stop reason: HOSPADM

## 2017-02-01 RX ORDER — SULFAMETHOXAZOLE AND TRIMETHOPRIM 800; 160 MG/1; MG/1
1 TABLET ORAL 2 TIMES DAILY
Qty: 20 TABLET | Refills: 0 | Status: SHIPPED | OUTPATIENT
Start: 2017-02-01 | End: 2017-02-11

## 2017-02-01 RX ORDER — ONDANSETRON 2 MG/ML
INJECTION INTRAMUSCULAR; INTRAVENOUS
Status: COMPLETED
Start: 2017-02-01 | End: 2017-02-01

## 2017-02-01 NOTE — PROGRESS NOTES
[facility]  Neuro Critical Care Progress Note    [facility] Patient Status:  Inpatient    1975 MRN [id number]   [facility] 6NE-A Attending Owen Haywood, DO   Hosp Day # 8 PCP None Pcp     Subjective:  Headache is better today aspirin-butalbital-caffeine [facility]) per cap 2 capsule, 2 capsule, Oral, Q6H PRN  •  ondansetron HCl (ZOFRAN) injection 4 mg, 4 mg, Intravenous, Q4H PRN  •  Metoclopramide HCl (REGLAN) injection 10 mg, 10 mg, Intravenous, Q6H PRN  •  Senna (SENOKOT) tab

## 2017-02-01 NOTE — PLAN OF CARE
NEUROLOGICAL - ADULT    • Achieves stable or improved neurological status Completed    • Absence of seizures Completed    • Remains free of injury related to seizure activity Completed    • Achieves maximal functionality and self care Completed        Belinda

## 2017-02-01 NOTE — ANESTHESIA POSTPROCEDURE EVALUATION
[address]  Patient Status:  Inpatient   Age/Gender 35year old female MRN [id number]   Location [address] [facility] Attending Enoch Gunter DO   Hosp Day # 8 PCP None Pcp       Anesthesia Post-op Note    Procedu

## 2017-02-01 NOTE — PLAN OF CARE
Assumed care of [facility] [place] @approximately 5127 awake, alert, oriented, flat affect, but pleasant, and cooperative with care. Q1H vitals; Q4H neuro checks: intact; [facility] [place] reports blurred vision appears to be resolving. Sinus rhythm on the monitor HR 60-70s.  SBP

## 2017-02-02 NOTE — DISCHARGE SUMMARY
[facility]  Discharge Summary    Ledy Huertas Patient Status:  Inpatient    1975 MRN [id number]   [facility] 6NE-A Attending No att. providers found   Hosp Day # 8 PCP None Pcp     Date of Admission: 2017    Date of Discharge: on that day to have Neuro Intervention Radiology place a Lumbar drain to drain CSF for help keep the fluid from collecting. For the first 24 hours, 15 ml/hr was being drained.   Patient was experiencing a HA and therefore the drain dropped to 10 ml/hr on p R-0    !! - Potential duplicate medications found. Please discuss with provider.       CONTINUE these medications which have NOT CHANGED    acetaminophen 325 MG Oral Tab  Take 325 mg by mouth every 6 (six) hours as needed for Pain., Historical    omeprazole

## 2017-02-03 RX ORDER — SULFAMETHOXAZOLE AND TRIMETHOPRIM 800; 160 MG/1; MG/1
TABLET ORAL
Qty: 14 TABLET | Refills: 0 | OUTPATIENT
Start: 2017-02-03

## 2017-02-03 NOTE — TELEPHONE ENCOUNTER
Per office records, patient just got a new Rx for Bactrim on 2/1/17 sent to pharmacy and confirmed  Refill request coming from old RX fro 1/4/17  Refill denied.

## 2017-02-06 ENCOUNTER — TELEPHONE (OUTPATIENT)
Dept: SURGERY | Facility: CLINIC | Age: 42
End: 2017-02-06

## 2017-02-06 NOTE — TELEPHONE ENCOUNTER
Spoke with patient who stated she would like to have surgery next week looking more at 2/14/17. Patient also has an MRI ordered. Will need to find out if  would like MRI to be done prior to surgery.     Spoke with  who stated he would like M

## 2017-02-06 NOTE — TELEPHONE ENCOUNTER
You are scheduled for a  shunt placement on 2/15/17 with Dr. Lourdes Rowell and Obdulia Leyva.     Pre-op instructions discussed with patient and surgical packet provided:    · You will need to contact the Pre-admission department at [phone] to schedule your pre-o

## 2017-02-06 NOTE — TELEPHONE ENCOUNTER
[facility], unable to speak with anyone  Re-faxed clinicals and [employer] form to Dr. Paulson People, Neurology    Also refaxed clinicials and [employer] form to [facility]

## 2017-02-07 ENCOUNTER — OFFICE VISIT (OUTPATIENT)
Dept: SURGERY | Facility: CLINIC | Age: 42
End: 2017-02-07

## 2017-02-07 VITALS
TEMPERATURE: 98 F | BODY MASS INDEX: 24.96 KG/M2 | HEIGHT: 67 IN | RESPIRATION RATE: 16 BRPM | DIASTOLIC BLOOD PRESSURE: 69 MMHG | SYSTOLIC BLOOD PRESSURE: 102 MMHG | WEIGHT: 159 LBS | HEART RATE: 89 BPM

## 2017-02-07 DIAGNOSIS — Z98.2 VP (VENTRICULOPERITONEAL) SHUNT STATUS: ICD-10-CM

## 2017-02-07 DIAGNOSIS — D32.9 MENINGIOMA (HCC): ICD-10-CM

## 2017-02-07 DIAGNOSIS — Z98.890 S/P CRANIOTOMY: Primary | ICD-10-CM

## 2017-02-07 PROCEDURE — 99203 OFFICE O/P NEW LOW 30 MIN: CPT | Performed by: SURGERY

## 2017-02-08 DIAGNOSIS — G91.9 HYDROCEPHALUS (HCC): Primary | ICD-10-CM

## 2017-02-08 DIAGNOSIS — G93.89 LEFT FRONTAL LOBE MASS: ICD-10-CM

## 2017-02-08 NOTE — H&P
New Patient  Tao Arechiga  4/18/1975 2/7/2017    This patient was referred by Dr Jamaica Thakkar for evaluation and consultation for upcoming placement of a  shunt. Chief Complaint: Headaches    History of Present Illness:  The patient is a 25-year-old femal beverages daily    Exercise No     Social History Narrative         Current outpatient prescriptions:   •  TraMADol HCl 50 MG Oral Tab, Take 1-2 tablets ( mg total) by mouth every 8 (eight) hours as needed for Pain., Disp: 40 tablet, Rfl: 0  •  Sulfa deficit. Labs/Radiology:  none    Discussed: The potential surgical and non-surgical treatment options were discussed with the patient.   The potential risks, benefits, outcomes/recovery and alternatives to any proposed surgery were also fully reviewed

## 2017-02-09 ENCOUNTER — TELEPHONE (OUTPATIENT)
Dept: SURGERY | Facility: CLINIC | Age: 42
End: 2017-02-09

## 2017-02-09 ENCOUNTER — HOSPITAL ENCOUNTER (OUTPATIENT)
Dept: MRI IMAGING | Age: 42
Discharge: HOME | End: 2017-02-09
Attending: PHYSICIAN ASSISTANT
Payer: OTHER GOVERNMENT

## 2017-02-09 DIAGNOSIS — D33.2 BENIGN NEOPLASM OF BRAIN, UNSPECIFIED BRAIN REGION (HCC): ICD-10-CM

## 2017-02-09 DIAGNOSIS — G93.89 LEFT FRONTAL LOBE MASS: ICD-10-CM

## 2017-02-09 DIAGNOSIS — D49.6 BRAIN TUMOR (HCC): ICD-10-CM

## 2017-02-09 DIAGNOSIS — R90.0 INTRACRANIAL MASS: ICD-10-CM

## 2017-02-09 PROCEDURE — A9575 INJ GADOTERATE MEGLUMI 0.1ML: HCPCS | Performed by: PHYSICIAN ASSISTANT

## 2017-02-09 PROCEDURE — 70553 MRI BRAIN STEM W/O & W/DYE: CPT | Performed by: RADIOLOGY

## 2017-02-09 NOTE — TELEPHONE ENCOUNTER
Called Chel Bailey from [facility] back had to leave a message that we did not obtain PA for this MRI we never seen the request because it was ordered when patient was an inpatient.  Told her to check the referral for inpatient stay because the [place] allows so many visit

## 2017-02-09 NOTE — TELEPHONE ENCOUNTER
Spoke with patient informed her that it is ok for the staples to stay in from her craniotomy. Patient understood and was thankful for the call back.

## 2017-02-13 ENCOUNTER — OFFICE VISIT (OUTPATIENT)
Dept: SURGERY | Facility: CLINIC | Age: 42
End: 2017-02-13

## 2017-02-13 DIAGNOSIS — Z86.011 HISTORY OF MENINGIOMA OF THE BRAIN: ICD-10-CM

## 2017-02-13 DIAGNOSIS — Z98.890 POST-OPERATIVE STATE: ICD-10-CM

## 2017-02-13 DIAGNOSIS — G91.9 HYDROCEPHALUS (HCC): Primary | ICD-10-CM

## 2017-02-13 PROCEDURE — 99024 POSTOP FOLLOW-UP VISIT: CPT | Performed by: NEUROLOGICAL SURGERY

## 2017-02-13 NOTE — PATIENT INSTRUCTIONS
Refill policies:    • Allow 2 business days for refills; controlled substances may take longer.   • Contact your pharmacy at least 5 days prior to running out of medication and have them send an electronic request or submit request through the “request re your physician has recommended that you have a procedure or additional testing performed. [facility] will contact your insurance carrier to obtain pre-certification or prior authorization.     Unfortunately, VANDANA has seen an increas

## 2017-02-14 NOTE — PROGRESS NOTES
[facility]   Outpatient Neurological Surgery Follow Up    Lakisha Urban  : 1975  PCP: Tamar Wahl. Jacobo Falk, [address]  Referring Provider: No ref.  provider found    REASON FOR VISIT:Patient presents with:  Test Results: follow u continues to have extra-axial fluid collection which is decreased from previous imaging as well as subgaleal fluid collection.     ASSESSMENT:  (G91.9) Hydrocephalus  (primary encounter diagnosis)  Plan: CT BRAIN OR HEAD (75016)            (Z98.890) Post-op

## 2017-02-15 NOTE — TELEPHONE ENCOUNTER
Lidya Simpson from Dr. Sebastien Dalal office calling, states patient has been rescheduled, asking about new date  Informed her that I am not aware of rescheduled date, will discuss with other nurse who schedules  Lidya Simpson would like call back once date is confirmed

## 2017-02-21 NOTE — TELEPHONE ENCOUNTER
Patient has not been rescheduled  No response from [place] after several attempts to get surgery auth

## 2017-02-23 ENCOUNTER — OFFICE VISIT (OUTPATIENT)
Dept: SURGERY | Facility: CLINIC | Age: 42
End: 2017-02-23

## 2017-02-23 ENCOUNTER — LAB ENCOUNTER (OUTPATIENT)
Dept: LAB | Age: 42
End: 2017-02-23
Attending: NEUROLOGICAL SURGERY
Payer: OTHER GOVERNMENT

## 2017-02-23 VITALS — HEART RATE: 88 BPM | SYSTOLIC BLOOD PRESSURE: 126 MMHG | DIASTOLIC BLOOD PRESSURE: 86 MMHG | RESPIRATION RATE: 18 BRPM

## 2017-02-23 DIAGNOSIS — T88.8XXD FLUID COLLECTION AT SURGICAL SITE, SUBSEQUENT ENCOUNTER: ICD-10-CM

## 2017-02-23 DIAGNOSIS — Z98.890 S/P CRANIOTOMY: Primary | ICD-10-CM

## 2017-02-23 DIAGNOSIS — M51.37 DDD (DEGENERATIVE DISC DISEASE), LUMBOSACRAL: Primary | ICD-10-CM

## 2017-02-23 LAB
BASOPHILS # BLD AUTO: 0.1 X10(3) UL (ref 0–0.1)
BASOPHILS NFR BLD AUTO: 1.5 %
C-REACTIVE PROTEIN: <0.29 MG/DL (ref ?–1)
EOSINOPHIL # BLD AUTO: 0.27 X10(3) UL (ref 0–0.3)
EOSINOPHIL NFR BLD AUTO: 4.1 %
ERYTHROCYTE [DISTWIDTH] IN BLOOD BY AUTOMATED COUNT: 12.1 % (ref 11.5–16)
HCT VFR BLD AUTO: 38.8 % (ref 34–50)
HGB BLD-MCNC: 12.8 G/DL (ref 12–16)
IMMATURE GRANULOCYTE COUNT: 0.03 X10(3) UL (ref 0–1)
IMMATURE GRANULOCYTE RATIO %: 0.5 %
LYMPHOCYTES # BLD AUTO: 2.74 X10(3) UL (ref 0.9–4)
LYMPHOCYTES NFR BLD AUTO: 41.9 %
MCH RBC QN AUTO: 29.6 PG (ref 27–33.2)
MCHC RBC AUTO-ENTMCNC: 33 G/DL (ref 31–37)
MCV RBC AUTO: 89.6 FL (ref 81–100)
MONOCYTES # BLD AUTO: 0.35 X10(3) UL (ref 0.1–0.6)
MONOCYTES NFR BLD AUTO: 5.4 %
NEUTROPHIL ABS PRELIM: 3.05 X10 (3) UL (ref 1.3–6.7)
NEUTROPHILS # BLD AUTO: 3.05 X10(3) UL (ref 1.3–6.7)
NEUTROPHILS NFR BLD AUTO: 46.6 %
PLATELET # BLD AUTO: 229 10(3)UL (ref 150–450)
RBC # BLD AUTO: 4.33 X10(6)UL (ref 3.8–5.1)
RED CELL DISTRIBUTION WIDTH-SD: 39.8 FL (ref 35.1–46.3)
SED RATE-ML: 7 MM/HR (ref 0–25)
WBC # BLD AUTO: 6.5 X10(3) UL (ref 4–13)

## 2017-02-23 PROCEDURE — 85652 RBC SED RATE AUTOMATED: CPT

## 2017-02-23 PROCEDURE — 85025 COMPLETE CBC W/AUTO DIFF WBC: CPT

## 2017-02-23 PROCEDURE — 36415 COLL VENOUS BLD VENIPUNCTURE: CPT

## 2017-02-23 PROCEDURE — 86140 C-REACTIVE PROTEIN: CPT

## 2017-02-23 PROCEDURE — 99024 POSTOP FOLLOW-UP VISIT: CPT | Performed by: NEUROLOGICAL SURGERY

## 2017-02-23 RX ORDER — METHYLPREDNISOLONE 4 MG/1
TABLET ORAL
Qty: 1 PACKAGE | Refills: 0 | Status: SHIPPED | OUTPATIENT
Start: 2017-02-23 | End: 2017-03-08 | Stop reason: ALTCHOICE

## 2017-02-23 RX ORDER — GABAPENTIN 100 MG/1
300 CAPSULE ORAL 3 TIMES DAILY
Qty: 270 CAPSULE | Refills: 0 | Status: SHIPPED | OUTPATIENT
Start: 2017-02-23 | End: 2017-04-03

## 2017-02-23 NOTE — PATIENT INSTRUCTIONS
Refill policies:    • Allow 2 business days for refills; controlled substances may take longer.   • Contact your pharmacy at least 5 days prior to running out of medication and have them send an electronic request or submit request through the “request re your physician has recommended that you have a procedure or additional testing performed. [facility] will contact your insurance carrier to obtain pre-certification or prior authorization.     Unfortunately, VANDANA has seen an increas

## 2017-03-07 ENCOUNTER — LAB ENCOUNTER (OUTPATIENT)
Dept: LAB | Age: 42
End: 2017-03-07
Attending: NEUROLOGICAL SURGERY
Payer: OTHER GOVERNMENT

## 2017-03-07 ENCOUNTER — HOSPITAL ENCOUNTER (OUTPATIENT)
Dept: MRI IMAGING | Age: 42
Discharge: HOME | End: 2017-03-07
Attending: NEUROLOGICAL SURGERY
Payer: OTHER GOVERNMENT

## 2017-03-07 DIAGNOSIS — T88.8XXD FLUID COLLECTION AT SURGICAL SITE, SUBSEQUENT ENCOUNTER: Primary | ICD-10-CM

## 2017-03-07 LAB
BASOPHILS # BLD AUTO: 0.09 X10(3) UL (ref 0–0.1)
BASOPHILS NFR BLD AUTO: 1.2 %
C-REACTIVE PROTEIN: <0.29 MG/DL (ref ?–1)
EOSINOPHIL # BLD AUTO: 0.23 X10(3) UL (ref 0–0.3)
EOSINOPHIL NFR BLD AUTO: 3 %
ERYTHROCYTE [DISTWIDTH] IN BLOOD BY AUTOMATED COUNT: 12.5 % (ref 11.5–16)
HCT VFR BLD AUTO: 39.8 % (ref 34–50)
HGB BLD-MCNC: 13.2 G/DL (ref 12–16)
IMMATURE GRANULOCYTE COUNT: 0.13 X10(3) UL (ref 0–1)
IMMATURE GRANULOCYTE RATIO %: 1.7 %
LYMPHOCYTES # BLD AUTO: 3.46 X10(3) UL (ref 0.9–4)
LYMPHOCYTES NFR BLD AUTO: 45.5 %
MCH RBC QN AUTO: 29.5 PG (ref 27–33.2)
MCHC RBC AUTO-ENTMCNC: 33.2 G/DL (ref 31–37)
MCV RBC AUTO: 89 FL (ref 81–100)
MONOCYTES # BLD AUTO: 0.37 X10(3) UL (ref 0.1–0.6)
MONOCYTES NFR BLD AUTO: 4.9 %
NEUTROPHIL ABS PRELIM: 3.32 X10 (3) UL (ref 1.3–6.7)
NEUTROPHILS # BLD AUTO: 3.32 X10(3) UL (ref 1.3–6.7)
NEUTROPHILS NFR BLD AUTO: 43.7 %
PLATELET # BLD AUTO: 243 10(3)UL (ref 150–450)
RBC # BLD AUTO: 4.47 X10(6)UL (ref 3.8–5.1)
RED CELL DISTRIBUTION WIDTH-SD: 41.1 FL (ref 35.1–46.3)
SED RATE-ML: 8 MM/HR (ref 0–25)
WBC # BLD AUTO: 7.6 X10(3) UL (ref 4–13)

## 2017-03-07 PROCEDURE — 36415 COLL VENOUS BLD VENIPUNCTURE: CPT

## 2017-03-07 PROCEDURE — 86140 C-REACTIVE PROTEIN: CPT

## 2017-03-07 PROCEDURE — 85652 RBC SED RATE AUTOMATED: CPT

## 2017-03-07 PROCEDURE — 85025 COMPLETE CBC W/AUTO DIFF WBC: CPT

## 2017-03-08 ENCOUNTER — OFFICE VISIT (OUTPATIENT)
Dept: SURGERY | Facility: CLINIC | Age: 42
End: 2017-03-08

## 2017-03-08 VITALS — SYSTOLIC BLOOD PRESSURE: 120 MMHG | HEART RATE: 100 BPM | RESPIRATION RATE: 14 BRPM | DIASTOLIC BLOOD PRESSURE: 80 MMHG

## 2017-03-08 DIAGNOSIS — Z98.890 POST-OPERATIVE STATE: Primary | ICD-10-CM

## 2017-03-08 RX ORDER — ACETAMINOPHEN / DIPHENHYDRAMINE 25; 500 MG/1; MG/1
2 TABLET ORAL NIGHTLY PRN
COMMUNITY

## 2017-03-08 RX ORDER — LORAZEPAM 2 MG/1
2 TABLET ORAL DAILY PRN
COMMUNITY

## 2017-03-08 NOTE — PATIENT INSTRUCTIONS
Refill policies:    • Allow 2 business days for refills; controlled substances may take longer.   • Contact your pharmacy at least 5 days prior to running out of medication and have them send an electronic request or submit request through the “request re your physician has recommended that you have a procedure or additional testing performed. [facility]) will contact your insurance carrier to obtain pre-certification or prior authorization.     Unfortunately, VANDANA has seen an increas

## 2017-03-10 NOTE — OPERATIVE REPORT
[facility]  Operative report    Bernardo Marmolejo Location: OR   CSN [id number] MRN [id number]   Admission Date 1/24/2017 Operation Date 2/1/2017   Attending Physician No att. providers found Operating Physician Hamzah Stiles., DO     Pre-Operative Jair Deleon patient up and we could abort the procedure. Patient was allowed to wake and brought to the postanesthesia care unit and care report were given to the responsible RN. The patient had no surgical procedure performed on her.   Once patient was awakened in

## 2017-03-10 NOTE — OPERATIVE REPORT
[facility]  Operative report    Maurice Luna Location: OR   CSN [id number] MRN [id number]   Admission Date 1/24/2017 Operation Date 1/24/2017   Attending Physician No att. providers found Operating Physician Enoch Gunter., DO     Pre-Operative Diagn right exposing the left frontal temporal pterional incision. The patient was prepped and draped in a sterile fashion. Using a combination of a 15 blade scalpel and a Metzenbaum scissors the incision was reopened.   A small amount of clear fluid was enco galeal layer was approximated using 2-0 Vicryl suture in an interrupted fashion. The skin was further approximated using staples. At this time the patient was [place] and her head was wrapped with 2 Kerlix and a Coban wrap.   Kalyn Nayak

## 2017-03-13 ENCOUNTER — OFFICE VISIT (OUTPATIENT)
Dept: SURGERY | Facility: CLINIC | Age: 42
End: 2017-03-13

## 2017-03-13 VITALS
SYSTOLIC BLOOD PRESSURE: 110 MMHG | RESPIRATION RATE: 16 BRPM | DIASTOLIC BLOOD PRESSURE: 68 MMHG | WEIGHT: 152 LBS | BODY MASS INDEX: 24 KG/M2 | HEART RATE: 76 BPM

## 2017-03-13 DIAGNOSIS — M54.50 CHRONIC MIDLINE LOW BACK PAIN WITHOUT SCIATICA: ICD-10-CM

## 2017-03-13 DIAGNOSIS — Z98.890 POST-OPERATIVE STATE: ICD-10-CM

## 2017-03-13 DIAGNOSIS — G89.29 CHRONIC MIDLINE LOW BACK PAIN WITHOUT SCIATICA: ICD-10-CM

## 2017-03-13 DIAGNOSIS — Z86.011 HISTORY OF MENINGIOMA OF THE BRAIN: Primary | ICD-10-CM

## 2017-03-13 PROCEDURE — 99024 POSTOP FOLLOW-UP VISIT: CPT | Performed by: NEUROLOGICAL SURGERY

## 2017-03-13 NOTE — PROGRESS NOTES
Patient here to follow up regarding facial swelling after surgery. Did not complete MRI Lumbar spine. Here to discuss the next steps.

## 2017-03-13 NOTE — PATIENT INSTRUCTIONS
Refill policies:    • Allow 2 business days for refills; controlled substances may take longer.   • Contact your pharmacy at least 5 days prior to running out of medication and have them send an electronic request or submit request through the “request re your physician has recommended that you have a procedure or additional testing performed. [facility] will contact your insurance carrier to obtain pre-certification or prior authorization.     Unfortunately, VANDANA has seen an increas

## 2017-03-14 NOTE — PROGRESS NOTES
[facility]   Outpatient Neurological Surgery Follow Up    Seferino Patrick  : 1975  3/13/2017  PCP: Britney Koch. [place], [address]  Referring Provider: No ref.  provider found    REASON FOR VISIT:Patient presents with:  Neurologic Problem patient does have significant atrophy of the left temporalis muscle. The patient should follow-up with us in 6 week for a incision check.   As for her low back pain she has not been able to get an MRI yet and we will see the patient back when the MRI of th

## 2017-03-24 ENCOUNTER — TELEPHONE (OUTPATIENT)
Dept: SURGERY | Facility: CLINIC | Age: 42
End: 2017-03-24

## 2017-03-24 NOTE — TELEPHONE ENCOUNTER
Patient states she has some increased fluid collection the past few days. Vision is blurry in the left eye over the past week. She feels\" intense pressure\" like she did before her surgery.     I offered her an appointment on 3/27/17, accepted  Informe

## 2017-03-27 ENCOUNTER — OFFICE VISIT (OUTPATIENT)
Dept: SURGERY | Facility: CLINIC | Age: 42
End: 2017-03-27

## 2017-03-27 ENCOUNTER — TELEPHONE (OUTPATIENT)
Dept: SURGERY | Facility: CLINIC | Age: 42
End: 2017-03-27

## 2017-03-27 VITALS — SYSTOLIC BLOOD PRESSURE: 110 MMHG | RESPIRATION RATE: 14 BRPM | HEART RATE: 80 BPM | DIASTOLIC BLOOD PRESSURE: 80 MMHG

## 2017-03-27 DIAGNOSIS — G91.9 HYDROCEPHALUS (HCC): ICD-10-CM

## 2017-03-27 DIAGNOSIS — G93.89 LEFT FRONTAL LOBE MASS: Primary | ICD-10-CM

## 2017-03-27 DIAGNOSIS — Z98.890 POST-OPERATIVE STATE: Primary | ICD-10-CM

## 2017-03-27 PROCEDURE — 99024 POSTOP FOLLOW-UP VISIT: CPT | Performed by: NEUROLOGICAL SURGERY

## 2017-03-27 NOTE — PATIENT INSTRUCTIONS
Refill policies:    • Allow 2 business days for refills; controlled substances may take longer.   • Contact your pharmacy at least 5 days prior to running out of medication and have them send an electronic request or submit request through the “request re insurance carrier to obtain pre-certification or prior authorization. Unfortunately, VANDANA has seen an increase in denial of payment even though the procedure/test has been pre-certified.   You are strongly encouraged to contact your insurance carrier to v you get instructions about when to resume the medication before you are discharged from the hospital  · Post operative appointment made.

## 2017-03-27 NOTE — TELEPHONE ENCOUNTER
You are scheduled for a  shunt placement on 4/13/17 with Dr. Lin Luna.     Pre-op instructions discussed with patient and surgical packet provided:      · You will need to contact the Pre-admission department at [phone] to schedule your pre-op testing.

## 2017-03-27 NOTE — TELEPHONE ENCOUNTER
[facility] and was informed that whoever is on call the day of patient's surgery will assist . 's general surgery office only operates on Monday, Wednesday and Friday.  Will need to discuss sx da

## 2017-03-29 NOTE — TELEPHONE ENCOUNTER
Per Dr. Nab Quiroga, patient needs to have CT of head with neuronavigation done the day prior to surgery or the morning of surgery  Orders are in system from 2/23/17, will need to schedule

## 2017-03-30 NOTE — TELEPHONE ENCOUNTER
Patient's surgery r/s to 4/12/17 in order to have general surgery 's group assist. CT for neuronav scheduled for the day prior to surgery on 4/11/17 at 9:45 am. Patient aware.     Once start time is verified will need to contact [address] [place] [address]

## 2017-04-05 ENCOUNTER — LAB ENCOUNTER (OUTPATIENT)
Dept: LAB | Age: 42
End: 2017-04-05
Attending: NEUROLOGICAL SURGERY
Payer: OTHER GOVERNMENT

## 2017-04-05 DIAGNOSIS — G91.9 HYDROCEPHALUS (HCC): ICD-10-CM

## 2017-04-05 PROCEDURE — 86901 BLOOD TYPING SEROLOGIC RH(D): CPT

## 2017-04-05 PROCEDURE — 85730 THROMBOPLASTIN TIME PARTIAL: CPT

## 2017-04-05 PROCEDURE — 36415 COLL VENOUS BLD VENIPUNCTURE: CPT

## 2017-04-05 PROCEDURE — 80048 BASIC METABOLIC PNL TOTAL CA: CPT

## 2017-04-05 PROCEDURE — 86900 BLOOD TYPING SEROLOGIC ABO: CPT

## 2017-04-05 PROCEDURE — 81001 URINALYSIS AUTO W/SCOPE: CPT

## 2017-04-05 PROCEDURE — 85610 PROTHROMBIN TIME: CPT

## 2017-04-05 PROCEDURE — 86850 RBC ANTIBODY SCREEN: CPT

## 2017-04-05 PROCEDURE — 87081 CULTURE SCREEN ONLY: CPT

## 2017-04-05 NOTE — PROGRESS NOTES
[facility]   Outpatient Neurological Surgery Follow Up    Casey Jonedgard  : 1975  PCP: Kevin VELAZQUEZ  Referring Provider: No ref.  provider found    REASON FOR VISIT:Patient presents with:  Headache      SUBJECTIVE:  This the patient has a persistence of fluid collecting in the temporal fossa after surgery and continued headaches I would like to place a shunt in the patient to divert the flow of CSF and hopefully resolve the continued persistent accumulation of CSF in the t

## 2017-04-06 NOTE — TELEPHONE ENCOUNTER
Spoke with patient who stated she was unaware of needing to see her PCP. Informed her that this was discussed at the office visit when she was scheduled. Patient was very reluctant to call and get this done.  Informed her that [facility] was requesting this p

## 2017-04-06 NOTE — TELEPHONE ENCOUNTER
Per  case will be approximately 3 hours.     Spoke with Melyssa Fink general surgery scheduler who stated earliest to start would be 8:30 and  would go there right after his first case and arrive at approximately 8:45 am.  Spoke with TERRY Pardo

## 2017-04-06 NOTE — TELEPHONE ENCOUNTER
LM for patient to call back. Need to see if patient has received medical clearance yet. Spoke with patient's PCP's office to see if patient received clearance for her upcoming surgery yet.  Was informed that patient has not been see for clearance and no

## 2017-04-10 NOTE — TELEPHONE ENCOUNTER
Spoke with PCP's office who stated patient has an appointment today 4/10/17 at 1:00 pm. Will check back later regarding clearance.

## 2017-04-11 ENCOUNTER — ANESTHESIA EVENT (OUTPATIENT)
Dept: SURGERY | Facility: HOSPITAL | Age: 42
DRG: 032 | End: 2017-04-11
Payer: MEDICARE

## 2017-04-11 ENCOUNTER — HOSPITAL ENCOUNTER (OUTPATIENT)
Dept: CT IMAGING | Facility: HOSPITAL | Age: 42
Discharge: HOME | End: 2017-04-11
Attending: NEUROLOGICAL SURGERY
Payer: OTHER GOVERNMENT

## 2017-04-11 DIAGNOSIS — G91.9 HYDROCEPHALUS (HCC): ICD-10-CM

## 2017-04-11 PROCEDURE — 70450 CT HEAD/BRAIN W/O DYE: CPT

## 2017-04-11 NOTE — TELEPHONE ENCOUNTER
Pt calling to make sure everything was received. Per RN all clearance has been received. Nothing else needed. Pt informed.

## 2017-04-11 NOTE — TELEPHONE ENCOUNTER
Nurse from Dr. Dixie Garcia office calling, [facility] patient told her that our office has received authorization for surgery  I informed her that we received medical clearance for surgery but not surgery authorization from [place].   I told her that we have made multip

## 2017-04-12 ENCOUNTER — ANESTHESIA (OUTPATIENT)
Dept: SURGERY | Facility: HOSPITAL | Age: 42
DRG: 032 | End: 2017-04-12
Payer: MEDICARE

## 2017-04-12 ENCOUNTER — APPOINTMENT (OUTPATIENT)
Dept: CT IMAGING | Facility: HOSPITAL | Age: 42
DRG: 032 | End: 2017-04-12
Attending: NURSE PRACTITIONER
Payer: MEDICARE

## 2017-04-12 ENCOUNTER — SURGERY (OUTPATIENT)
Age: 42
End: 2017-04-12

## 2017-04-12 ENCOUNTER — HOSPITAL ENCOUNTER (INPATIENT)
Facility: HOSPITAL | Age: 42
LOS: 7 days | Discharge: HOME | DRG: 032 | End: 2017-04-19
Attending: NEUROLOGICAL SURGERY | Admitting: NEUROLOGICAL SURGERY
Payer: MEDICARE

## 2017-04-12 ENCOUNTER — APPOINTMENT (OUTPATIENT)
Dept: GENERAL RADIOLOGY | Facility: HOSPITAL | Age: 42
DRG: 032 | End: 2017-04-12
Attending: NEUROLOGICAL SURGERY
Payer: MEDICARE

## 2017-04-12 DIAGNOSIS — G91.9 HYDROCEPHALUS (HCC): Primary | ICD-10-CM

## 2017-04-12 DIAGNOSIS — G93.89 SUBGALEAL FLUID COLLECTION: ICD-10-CM

## 2017-04-12 DIAGNOSIS — G93.89 LEFT FRONTAL LOBE MASS: ICD-10-CM

## 2017-04-12 PROCEDURE — 00160J6 BYPASS CEREBRAL VENTRICLE TO PERITONEAL CAVITY WITH SYNTHETIC SUBSTITUTE, OPEN APPROACH: ICD-10-PCS | Performed by: NEUROLOGICAL SURGERY

## 2017-04-12 PROCEDURE — 74000 XR SHUNT POSITION SERIES  (CPT=70250,74000): CPT

## 2017-04-12 PROCEDURE — 70360 X-RAY EXAM OF NECK: CPT

## 2017-04-12 PROCEDURE — 70450 CT HEAD/BRAIN W/O DYE: CPT

## 2017-04-12 PROCEDURE — 71010 HC RAD CHEST XRAY SINGLE VIEW: CPT

## 2017-04-12 PROCEDURE — 99233 SBSQ HOSP IP/OBS HIGH 50: CPT | Performed by: INTERNAL MEDICINE

## 2017-04-12 PROCEDURE — 70250 X-RAY EXAM OF SKULL: CPT

## 2017-04-12 PROCEDURE — 0WJG4ZZ INSPECTION OF PERITONEAL CAVITY, PERCUTANEOUS ENDOSCOPIC APPROACH: ICD-10-PCS | Performed by: NEUROLOGICAL SURGERY

## 2017-04-12 DEVICE — IMPLANTABLE DEVICE: Type: IMPLANTABLE DEVICE | Site: HEAD | Status: FUNCTIONAL

## 2017-04-12 RX ORDER — ONDANSETRON 2 MG/ML
4 INJECTION INTRAMUSCULAR; INTRAVENOUS EVERY 6 HOURS PRN
Status: DISCONTINUED | OUTPATIENT
Start: 2017-04-12 | End: 2017-04-19

## 2017-04-12 RX ORDER — FAMOTIDINE 20 MG/1
20 TABLET ORAL 2 TIMES DAILY
Status: DISCONTINUED | OUTPATIENT
Start: 2017-04-12 | End: 2017-04-13

## 2017-04-12 RX ORDER — NALOXONE HYDROCHLORIDE 0.4 MG/ML
80 INJECTION, SOLUTION INTRAMUSCULAR; INTRAVENOUS; SUBCUTANEOUS AS NEEDED
Status: DISCONTINUED | OUTPATIENT
Start: 2017-04-12 | End: 2017-04-12 | Stop reason: HOSPADM

## 2017-04-12 RX ORDER — HYDROMORPHONE HYDROCHLORIDE 1 MG/ML
0.2 INJECTION, SOLUTION INTRAMUSCULAR; INTRAVENOUS; SUBCUTANEOUS
Status: DISCONTINUED | OUTPATIENT
Start: 2017-04-12 | End: 2017-04-14

## 2017-04-12 RX ORDER — ONDANSETRON 2 MG/ML
4 INJECTION INTRAMUSCULAR; INTRAVENOUS ONCE
Status: COMPLETED | OUTPATIENT
Start: 2017-04-12 | End: 2017-04-12

## 2017-04-12 RX ORDER — BACITRACIN 50000 [USP'U]/1
INJECTION, POWDER, LYOPHILIZED, FOR SOLUTION INTRAMUSCULAR AS NEEDED
Status: DISCONTINUED | OUTPATIENT
Start: 2017-04-12 | End: 2017-04-12 | Stop reason: HOSPADM

## 2017-04-12 RX ORDER — ONDANSETRON 2 MG/ML
INJECTION INTRAMUSCULAR; INTRAVENOUS
Status: COMPLETED
Start: 2017-04-12 | End: 2017-04-12

## 2017-04-12 RX ORDER — PANTOPRAZOLE SODIUM 20 MG/1
20 TABLET, DELAYED RELEASE ORAL
Status: DISCONTINUED | OUTPATIENT
Start: 2017-04-13 | End: 2017-04-19

## 2017-04-12 RX ORDER — HYDROMORPHONE HYDROCHLORIDE 1 MG/ML
0.8 INJECTION, SOLUTION INTRAMUSCULAR; INTRAVENOUS; SUBCUTANEOUS EVERY 2 HOUR PRN
Status: DISCONTINUED | OUTPATIENT
Start: 2017-04-12 | End: 2017-04-12

## 2017-04-12 RX ORDER — SODIUM CHLORIDE, SODIUM LACTATE, POTASSIUM CHLORIDE, CALCIUM CHLORIDE 600; 310; 30; 20 MG/100ML; MG/100ML; MG/100ML; MG/100ML
INJECTION, SOLUTION INTRAVENOUS CONTINUOUS
Status: DISCONTINUED | OUTPATIENT
Start: 2017-04-12 | End: 2017-04-12 | Stop reason: HOSPADM

## 2017-04-12 RX ORDER — HYDRALAZINE HYDROCHLORIDE 20 MG/ML
10 INJECTION INTRAMUSCULAR; INTRAVENOUS
Status: DISCONTINUED | OUTPATIENT
Start: 2017-04-12 | End: 2017-04-13

## 2017-04-12 RX ORDER — OMEPRAZOLE 20 MG/1
20 CAPSULE, DELAYED RELEASE ORAL 2 TIMES DAILY
COMMUNITY

## 2017-04-12 RX ORDER — MIDAZOLAM HYDROCHLORIDE 1 MG/ML
INJECTION INTRAMUSCULAR; INTRAVENOUS
Status: COMPLETED
Start: 2017-04-12 | End: 2017-04-12

## 2017-04-12 RX ORDER — HYDROMORPHONE HYDROCHLORIDE 1 MG/ML
INJECTION, SOLUTION INTRAMUSCULAR; INTRAVENOUS; SUBCUTANEOUS
Status: DISCONTINUED
Start: 2017-04-12 | End: 2017-04-12 | Stop reason: WASHOUT

## 2017-04-12 RX ORDER — BUPIVACAINE HYDROCHLORIDE AND EPINEPHRINE 5; 5 MG/ML; UG/ML
INJECTION, SOLUTION EPIDURAL; INTRACAUDAL; PERINEURAL AS NEEDED
Status: DISCONTINUED | OUTPATIENT
Start: 2017-04-12 | End: 2017-04-12 | Stop reason: HOSPADM

## 2017-04-12 RX ORDER — BUPIVACAINE HYDROCHLORIDE AND EPINEPHRINE 2.5; 5 MG/ML; UG/ML
INJECTION, SOLUTION EPIDURAL; INFILTRATION; INTRACAUDAL; PERINEURAL AS NEEDED
Status: DISCONTINUED | OUTPATIENT
Start: 2017-04-12 | End: 2017-04-12 | Stop reason: HOSPADM

## 2017-04-12 RX ORDER — LORAZEPAM 1 MG/1
2 TABLET ORAL DAILY PRN
Status: DISCONTINUED | OUTPATIENT
Start: 2017-04-12 | End: 2017-04-19

## 2017-04-12 RX ORDER — MEPERIDINE HYDROCHLORIDE 25 MG/ML
INJECTION INTRAMUSCULAR; INTRAVENOUS; SUBCUTANEOUS
Status: COMPLETED
Start: 2017-04-12 | End: 2017-04-12

## 2017-04-12 RX ORDER — ACETAMINOPHEN / DIPHENHYDRAMINE 25; 500 MG/1; MG/1
2 TABLET ORAL NIGHTLY PRN
Status: DISCONTINUED | OUTPATIENT
Start: 2017-04-12 | End: 2017-04-12 | Stop reason: SDUPTHER

## 2017-04-12 RX ORDER — HYDROMORPHONE HYDROCHLORIDE 1 MG/ML
0.2 INJECTION, SOLUTION INTRAMUSCULAR; INTRAVENOUS; SUBCUTANEOUS EVERY 2 HOUR PRN
Status: DISCONTINUED | OUTPATIENT
Start: 2017-04-12 | End: 2017-04-12

## 2017-04-12 RX ORDER — SODIUM CHLORIDE 9 MG/ML
INJECTION, SOLUTION INTRAVENOUS CONTINUOUS
Status: DISCONTINUED | OUTPATIENT
Start: 2017-04-12 | End: 2017-04-15

## 2017-04-12 RX ORDER — SODIUM CHLORIDE, SODIUM LACTATE, POTASSIUM CHLORIDE, CALCIUM CHLORIDE 600; 310; 30; 20 MG/100ML; MG/100ML; MG/100ML; MG/100ML
INJECTION, SOLUTION INTRAVENOUS CONTINUOUS
Status: DISCONTINUED | OUTPATIENT
Start: 2017-04-12 | End: 2017-04-13

## 2017-04-12 RX ORDER — HYDROMORPHONE HYDROCHLORIDE 1 MG/ML
0.4 INJECTION, SOLUTION INTRAMUSCULAR; INTRAVENOUS; SUBCUTANEOUS
Status: DISCONTINUED | OUTPATIENT
Start: 2017-04-12 | End: 2017-04-14

## 2017-04-12 RX ORDER — MEPERIDINE HYDROCHLORIDE 25 MG/ML
12.5 INJECTION INTRAMUSCULAR; INTRAVENOUS; SUBCUTANEOUS
Status: DISCONTINUED | OUTPATIENT
Start: 2017-04-12 | End: 2017-04-12 | Stop reason: HOSPADM

## 2017-04-12 RX ORDER — DIPHENHYDRAMINE HCL 50 MG
50 CAPSULE ORAL NIGHTLY PRN
Status: DISCONTINUED | OUTPATIENT
Start: 2017-04-12 | End: 2017-04-19

## 2017-04-12 RX ORDER — DIAZEPAM 5 MG/1
5 TABLET ORAL EVERY 6 HOURS PRN
Status: DISCONTINUED | OUTPATIENT
Start: 2017-04-12 | End: 2017-04-13

## 2017-04-12 RX ORDER — LABETALOL HYDROCHLORIDE 5 MG/ML
10 INJECTION, SOLUTION INTRAVENOUS AS NEEDED
Status: DISCONTINUED | OUTPATIENT
Start: 2017-04-12 | End: 2017-04-13

## 2017-04-12 RX ORDER — FAMOTIDINE 10 MG/ML
20 INJECTION, SOLUTION INTRAVENOUS 2 TIMES DAILY
Status: DISCONTINUED | OUTPATIENT
Start: 2017-04-12 | End: 2017-04-13

## 2017-04-12 RX ORDER — HYDROMORPHONE HYDROCHLORIDE 1 MG/ML
0.4 INJECTION, SOLUTION INTRAMUSCULAR; INTRAVENOUS; SUBCUTANEOUS EVERY 5 MIN PRN
Status: DISCONTINUED | OUTPATIENT
Start: 2017-04-12 | End: 2017-04-12 | Stop reason: HOSPADM

## 2017-04-12 RX ORDER — TRAMADOL HYDROCHLORIDE 50 MG/1
TABLET ORAL EVERY 6 HOURS PRN
Status: DISCONTINUED | OUTPATIENT
Start: 2017-04-12 | End: 2017-04-19

## 2017-04-12 RX ORDER — MIDAZOLAM HYDROCHLORIDE 1 MG/ML
1 INJECTION INTRAMUSCULAR; INTRAVENOUS EVERY 5 MIN PRN
Status: DISCONTINUED | OUTPATIENT
Start: 2017-04-12 | End: 2017-04-12 | Stop reason: HOSPADM

## 2017-04-12 RX ORDER — METOCLOPRAMIDE HYDROCHLORIDE 5 MG/ML
10 INJECTION INTRAMUSCULAR; INTRAVENOUS ONCE
Status: DISCONTINUED | OUTPATIENT
Start: 2017-04-12 | End: 2017-04-12 | Stop reason: HOSPADM

## 2017-04-12 RX ORDER — ACETAMINOPHEN 500 MG
1000 TABLET ORAL NIGHTLY PRN
Status: DISCONTINUED | OUTPATIENT
Start: 2017-04-12 | End: 2017-04-19

## 2017-04-12 RX ORDER — HYDROMORPHONE HYDROCHLORIDE 1 MG/ML
0.8 INJECTION, SOLUTION INTRAMUSCULAR; INTRAVENOUS; SUBCUTANEOUS
Status: DISCONTINUED | OUTPATIENT
Start: 2017-04-12 | End: 2017-04-14

## 2017-04-12 RX ORDER — MEPERIDINE HYDROCHLORIDE 25 MG/ML
12.5 INJECTION INTRAMUSCULAR; INTRAVENOUS; SUBCUTANEOUS AS NEEDED
Status: DISCONTINUED | OUTPATIENT
Start: 2017-04-12 | End: 2017-04-12 | Stop reason: HOSPADM

## 2017-04-12 RX ORDER — HYDROMORPHONE HYDROCHLORIDE 1 MG/ML
0.4 INJECTION, SOLUTION INTRAMUSCULAR; INTRAVENOUS; SUBCUTANEOUS EVERY 2 HOUR PRN
Status: DISCONTINUED | OUTPATIENT
Start: 2017-04-12 | End: 2017-04-12

## 2017-04-12 NOTE — H&P
CONSULTATION - NEUROSURGEON    PATIENT NAME: Bakari Rao, : 1975, 35year old female   MR# [id number] [facility]# [id number]    CC: headaches    HPI:  This is a 41F that presents for VPS placement due to hydrocephalus causing subgaleal fluid collection at • Anxiety      Panic disorder   • Osteoarthritis    • Muscle weakness      legs l>R        PAST SURGICAL HISTORY:      Past Surgical History    REPAIR ING HERNIA,5+Y/O,REDUCIBL      TOTAL ABDOM HYSTERECTOMY      REMOVAL GALLBLADDER  2007    BREAST SURGER cavity. ASSESSMENT:    Adelaida Manning is a 35year oldfemale that has been admitted for Hydrocephalus and persistent subgaleal fluid collection at the left temporal fossa, HD#0.     Patient Active Problem List:     Benign neoplasm of brain ([facility]     Jocelyne

## 2017-04-12 NOTE — OPERATIVE REPORT
[facility]    PATIENT'S NAME: Raymundo JOYA   ATTENDING PHYSICIAN: Geovanna Pavon DO   OPERATING PHYSICIAN: Malorie Belcher M.D.    PATIENT ACCOUNT#:   [de-identified]    LOCATION:  OR  OR POOL ROOMS 15 EDWP 10  MEDICAL RECORD #:   [id number]       DATE OF Zoila Neves.    FINDINGS:  Intra-abdominally, there is no significant intra-abdominal adhesions.   The ventricular portion of the ventriculoperitoneal shunt is placed between the liver and the diaphragm with smooth contour and excellent drainage of cerebrospinal smooth contour and no kinking noted. At this point, the laparoscopic trocars were removed, and pneumoperitoneum was aspirated.   The remaining trocars were removed, and the skin incisions were cleansed and irrigated, and the skin incisions were reapproxima

## 2017-04-12 NOTE — BRIEF OP NOTE
[address] SURGERY  Brief Op Note     Alfred Antonio Location: OR   [facility] [id number] [facility] [id number]   Admission Date 4/12/2017 Operation Date 4/12/2017   Attending Physician Simone Mercer DO Operating Physician Kaylen Barrios MD       Pre-Operative

## 2017-04-12 NOTE — ANESTHESIA POSTPROCEDURE EVALUATION
[facility] Patient Status:  Surgery Admit   Age/Gender 35year old female MRN [id number]   Location [facility] Attending Horacio Daugherty 55 Day # 0 PCP DILSHAD ALI       Anesthesia Post-op Note    Procedure(s):  Mary Guillaume

## 2017-04-12 NOTE — ANESTHESIA PREPROCEDURE EVALUATION
PRE-OP EVALUATION    Patient Name: Wing Cheung    Pre-op Diagnosis: Hydrocephalus [G91.9]  Left frontal lobe mass [G93.9]    Procedure(s):  Ventriculoperitoneal shunt insertion with neuronavigation of proximal catheter and laparoscopic abdominal placemen Cardiovascular    Negative cardiovascular ROS. Exercise tolerance: good     MET: >4                                           Endo/Other    Negative endo/other ROS.                               Pulmonary    Negative pulmonary R accepted.   Plan/risks discussed with: patient                Present on Admission:   **None**

## 2017-04-12 NOTE — BRIEF OP NOTE
[facility] SURGERY  Brief Op Note     Jose David Swartz Location: OR   [facility] [id number] MRN [id number]   Admission Date 4/12/2017 Operation Date 4/12/2017   Attending Physician Riri Ramirez DO Operating Physician Ximena Van.  Liane Hartman DO       Pre-Operative

## 2017-04-13 ENCOUNTER — APPOINTMENT (OUTPATIENT)
Dept: GENERAL RADIOLOGY | Facility: HOSPITAL | Age: 42
DRG: 032 | End: 2017-04-13
Attending: NEUROLOGICAL SURGERY
Payer: MEDICARE

## 2017-04-13 ENCOUNTER — APPOINTMENT (OUTPATIENT)
Dept: CT IMAGING | Facility: HOSPITAL | Age: 42
DRG: 032 | End: 2017-04-13
Attending: NEUROLOGICAL SURGERY
Payer: MEDICARE

## 2017-04-13 ENCOUNTER — SURGERY (OUTPATIENT)
Age: 42
End: 2017-04-13

## 2017-04-13 PROCEDURE — 70450 CT HEAD/BRAIN W/O DYE: CPT

## 2017-04-13 PROCEDURE — 99232 SBSQ HOSP IP/OBS MODERATE 35: CPT | Performed by: INTERNAL MEDICINE

## 2017-04-13 PROCEDURE — 00W60JZ REVISION OF SYNTHETIC SUBSTITUTE IN CEREBRAL VENTRICLE, OPEN APPROACH: ICD-10-PCS | Performed by: NEUROLOGICAL SURGERY

## 2017-04-13 PROCEDURE — 76000 FLUOROSCOPY <1 HR PHYS/QHP: CPT

## 2017-04-13 DEVICE — PUDENZ NYLON STRAIGHT CONNECTOR
Type: IMPLANTABLE DEVICE | Site: HEAD | Status: FUNCTIONAL
Brand: NATUS®

## 2017-04-13 RX ORDER — NALOXONE HYDROCHLORIDE 0.4 MG/ML
80 INJECTION, SOLUTION INTRAMUSCULAR; INTRAVENOUS; SUBCUTANEOUS AS NEEDED
Status: DISCONTINUED | OUTPATIENT
Start: 2017-04-13 | End: 2017-04-13 | Stop reason: HOSPADM

## 2017-04-13 RX ORDER — ACETAMINOPHEN 10 MG/ML
INJECTION, SOLUTION INTRAVENOUS
Status: DISCONTINUED | OUTPATIENT
Start: 2017-04-13 | End: 2017-04-13 | Stop reason: HOSPADM

## 2017-04-13 RX ORDER — MEPERIDINE HYDROCHLORIDE 25 MG/ML
12.5 INJECTION INTRAMUSCULAR; INTRAVENOUS; SUBCUTANEOUS AS NEEDED
Status: DISCONTINUED | OUTPATIENT
Start: 2017-04-13 | End: 2017-04-13 | Stop reason: HOSPADM

## 2017-04-13 RX ORDER — CEFAZOLIN SODIUM 1 G/3ML
INJECTION, POWDER, FOR SOLUTION INTRAMUSCULAR; INTRAVENOUS
Status: DISCONTINUED | OUTPATIENT
Start: 2017-04-13 | End: 2017-04-13 | Stop reason: HOSPADM

## 2017-04-13 RX ORDER — SODIUM CHLORIDE 9 MG/ML
INJECTION, SOLUTION INTRAVENOUS CONTINUOUS
Status: DISCONTINUED | OUTPATIENT
Start: 2017-04-13 | End: 2017-04-13

## 2017-04-13 NOTE — PLAN OF CARE
A/Ox4, vss on room air, neurologically intact. C/o \"pop/swooshing\" sound in right head, with uncontrolled pain to right neck. Notified Dr. Kandice Guardado, orders to for Valium. He states he will see  Patient in the early afternoon to possibly adjust settings.  Med

## 2017-04-13 NOTE — CONSULTS
[facility]  CONSULT     Adelaida Nigel Patient Status:  Inpatient    1975 MRN [id number]   [facility] 7NE-A Attending Bernadette Dickson, DO   Hosp Day # 0 PCP DILSHAD VELAZQUEZ     Reason for consult: medical management    Requested by Anxiety. Disp:  Rfl:    Diphenhydramine-APAP, sleep, (TYLENOL PM EXTRA STRENGTH)  MG Oral Tab Take 2 tablets by mouth nightly as needed. Disp:  Rfl:    POTASSIUM GLUCONATE OR Take 1 tablet by mouth daily.  Disp:  Rfl:    Cyanocobalamin (VITAMIN B12 hours. No results for input(s): PTP, INR in the last 72 hours. No results for input(s): TROP, CK in the last 72 hours. Imaging: Imaging data reviewed in Epic. ASSESSMENT / PLAN:     1. Hydrocephalus s/p  shunt  1.  Management per neurosurger

## 2017-04-13 NOTE — PROGRESS NOTES
[facility]  Progress Note     Toni Sarabia Patient Status:  Inpatient    1975 MRN [id number]   [facility] 7NE-A Attending Florentino Nevarez DO     Chief Complaint: s/p  shunt    S: Patient says she had a headache gary randall concerning for PBC but GI didn't think pattern of abnormal LFTs consistent with this.    2. Repeat LFTs in AM  3. Outpatient GI follow up      Quality:  · DVT Prophylaxis: SCDs  · CODE status: full  · Jean: np    OK to discharge from medical perspective.

## 2017-04-13 NOTE — PLAN OF CARE
Assumed care @0700. Pt AOx4. VSS on RA. Seizure precautions in place. Present with Dr. Jerome Gutierrez when rounding on patient. Plan for Ventriculoperitoneal shunt revision tonight. Consent signed and in chart. Dilaudid given for pain.  1 dose zofran given

## 2017-04-14 ENCOUNTER — APPOINTMENT (OUTPATIENT)
Dept: GENERAL RADIOLOGY | Facility: HOSPITAL | Age: 42
DRG: 032 | End: 2017-04-14
Attending: NURSE PRACTITIONER
Payer: MEDICARE

## 2017-04-14 PROCEDURE — 70360 X-RAY EXAM OF NECK: CPT

## 2017-04-14 PROCEDURE — 70250 X-RAY EXAM OF SKULL: CPT

## 2017-04-14 PROCEDURE — 99232 SBSQ HOSP IP/OBS MODERATE 35: CPT | Performed by: INTERNAL MEDICINE

## 2017-04-14 PROCEDURE — 71010 HC RAD CHEST XRAY SINGLE VIEW: CPT

## 2017-04-14 PROCEDURE — 74000 XR SHUNT POSITION SERIES  (CPT=70250,74000): CPT

## 2017-04-14 RX ORDER — BISACODYL 10 MG
10 SUPPOSITORY, RECTAL RECTAL
Status: DISCONTINUED | OUTPATIENT
Start: 2017-04-14 | End: 2017-04-19

## 2017-04-14 RX ORDER — POLYETHYLENE GLYCOL 3350 17 G/17G
17 POWDER, FOR SOLUTION ORAL DAILY PRN
Status: DISCONTINUED | OUTPATIENT
Start: 2017-04-14 | End: 2017-04-19

## 2017-04-14 RX ORDER — DOCUSATE SODIUM 100 MG/1
100 CAPSULE, LIQUID FILLED ORAL 2 TIMES DAILY
Status: DISCONTINUED | OUTPATIENT
Start: 2017-04-14 | End: 2017-04-19

## 2017-04-14 RX ORDER — POLYETHYLENE GLYCOL 3350 17 G/17G
17 POWDER, FOR SOLUTION ORAL DAILY
Status: DISCONTINUED | OUTPATIENT
Start: 2017-04-14 | End: 2017-04-14

## 2017-04-14 NOTE — PROGRESS NOTES
[id number] Neurosurgery Progress Note    Abimbola Carrillo Patient Status:  Inpatient    1975 MRN [id number]   [facility] 7NE-A Attending Shiela Goodpasture, DO   Hosp Day # 1 PCP DILSHAD VELAZQUEZ     Subjective:  Abimbola Carrillo is a(n) 50 softeners and suppository  5.  Check CBC    CPM  Dr. Gloria Thorne to follow      GEE Luong  [address]  Pager [phone]  4/14/2017, 10:26 AM

## 2017-04-14 NOTE — PROGRESS NOTES
INPATIENT PROGRESS NOTE    Assessment:   Casey Johnson in room [facility] PACU/ PACU Allen Granado, is a 35year old female, Hospital Day ( LOS: 1 day ) hospitalized for Hydrocephalus.     Post-Op Day #1 Surgery s/p Procedure(s):  VENTRICULAR PERITONEAL SHUNT INSERTION

## 2017-04-14 NOTE — BRIEF OP NOTE
[address] POST ANESTHESIA CARE UNIT  Brief Op Note     Meryle Beath Location: OR   [facility] [id number] N [id number]   Admission Date 4/12/2017 Operation Date 4/13/2017   Attending Physician Dejuan Swartz DO Operating Physician Eladio Berger

## 2017-04-14 NOTE — PROGRESS NOTES
[facility]  Progress Note     Bull Dang Patient Status:  Inpatient    1975 MRN [id number]   [facility] 7NE-A Attending Rosa Elena Miller DO     Chief Complaint: s/p  shunt    S: Patient underwent  shunt revision due to tramadol  6. Transaminitis  1. Previous workup includes +anti-mitochondrial antibody concerning for PBC but GI didn't think pattern of abnormal LFTs consistent with this.    2. Repeat LFTs in AM  3.  Outpatient GI follow up      Quality:  · DVT Prophylaxis:

## 2017-04-14 NOTE — PLAN OF CARE
Earlier pt stated her face felt hot. No temp. Cbc ok. MD aware. States better this afternoon. Up in room. Will cont to monitor.    DISCHARGE PLANNING    • Discharge to home or other facility with appropriate resources Progressing        NEUROLOGICAL - ADULT

## 2017-04-14 NOTE — CM/SW NOTE
Pt seen for PHQ4. Pt says she is a disabled  due to her anxiety. She has been diagnosed with anxiety by her PCP and she has been on Wellbutrin in the past and still takes Lorazapam from time to time.   Pt says she does not need any other resources

## 2017-04-14 NOTE — PLAN OF CARE
Patient returned from OR. Report given to me from Dr. Cherry Durham. Patient alert and oriented but drowsy. Vital signs stable. Resting comfortably in bed. Denies any pain or discomfort. Meds given per MAR. Call light in reach.      DISCHARGE PLANNING    • D

## 2017-04-15 ENCOUNTER — APPOINTMENT (OUTPATIENT)
Dept: CT IMAGING | Facility: HOSPITAL | Age: 42
DRG: 032 | End: 2017-04-15
Attending: PHYSICIAN ASSISTANT
Payer: MEDICARE

## 2017-04-15 PROCEDURE — 70450 CT HEAD/BRAIN W/O DYE: CPT

## 2017-04-15 RX ORDER — DOXYCYCLINE HYCLATE 100 MG/1
100 CAPSULE ORAL EVERY 12 HOURS SCHEDULED
Status: DISCONTINUED | OUTPATIENT
Start: 2017-04-15 | End: 2017-04-19

## 2017-04-15 RX ORDER — DIAPER,BRIEF,INFANT-TODD,DISP
EACH MISCELLANEOUS 2 TIMES DAILY
Status: DISCONTINUED | OUTPATIENT
Start: 2017-04-15 | End: 2017-04-19

## 2017-04-15 NOTE — PLAN OF CARE
No c/o headache today. Vss. Up in room. Blinds open. will cont to monitor.    DISCHARGE PLANNING    • Discharge to home or other facility with appropriate resources Progressing        NEUROLOGICAL - ADULT    • Achieves stable or improved neurological status

## 2017-04-15 NOTE — PLAN OF CARE
Patient alert and oriented times four. Meds given per MAR. PRN suppository per patient request to have a BM. Patient's vital signs stable. Swelling in face decreased. Denies any pain or discomfort. Resting comfortably in bed with call light in reach.

## 2017-04-15 NOTE — PLAN OF CARE
Dr [facility] here. Shunt was adjusted at bedside. Pt tolerated well. Pt with no c/o at this time. Pt understanding of need to stay in hospital to be monitored. Agreeable. Will cont to monitor.    Dr [facility] aware of pt not having IV access due to tape allergy and

## 2017-04-15 NOTE — PROGRESS NOTES
[facility]  Neurosurgery Progress Note    Meryle Beath Patient Status:  Inpatient    1975 MRN [id number]   [facility] 7NE-A Attending Dejuan Swartz, DO   Hosp Day # 3 PCP DILSHAD VELAZQUEZ       Subjective:  Meryle Beath is a 35 year Shunt POD #2  3. Expected post-op pain  4. Hydrocephalus causing left temporal sub-galeal fluid collection  5. Hx Left frontal WHO Grade I Meningioma, s/p Craniotomy 11/17/16 by Dr. Meghan Soils:  1. OOB to chair for all meals  2. Ambulate QID  3.  Pain co

## 2017-04-16 ENCOUNTER — APPOINTMENT (OUTPATIENT)
Dept: CT IMAGING | Facility: HOSPITAL | Age: 42
DRG: 032 | End: 2017-04-16
Attending: NEUROLOGICAL SURGERY
Payer: MEDICARE

## 2017-04-16 PROCEDURE — 70450 CT HEAD/BRAIN W/O DYE: CPT

## 2017-04-16 NOTE — PLAN OF CARE
Complains of mild headache, declines pain medication at this time  Head incisions ESTRELLITA, no drainage noted  No neurological deficits noted  Resting comfortably  Will continue to monitor

## 2017-04-16 NOTE — PLAN OF CARE
Assumed care of pt 1900. Aox4. Neuro checks q 4hrs. Neuro exam intact. Pt denies HA. Incisions ESTRELLITA, no drainage noted. Lap sites with steri-strips, no drainage noted. VSS. Pt requested something to help her sleep. PRN benedryl given with results.  Pt sleep

## 2017-04-16 NOTE — PROGRESS NOTES
[facility]  Neurosurgery Progress Note    Liliam Hobbs Patient Status:  Inpatient    1975 MRN [id number]   [facility] 7NE-A Attending Rach Earl, DO   Hosp Day # 4 PCP DILSHAD VELAZQUEZ       Subjective:  Liliam Hobbs is a 35 year 5 mm.      Subcutaneous fluid collection has shifted position, however is unchanged in size. [facility] 4/15/17:  CONCLUSION:     1. Postoperative changes from a right frontal approach ventriculostomy catheter revision. The catheter is in stable position.  The is soft nontender nondistended incisions are dressed and clean dry and intact. In reviewing the CT scan the subgaleal fluid continues to be significantly decreased from prior to the shunt placement.   The epidural fluid collection which was seen after th

## 2017-04-17 ENCOUNTER — APPOINTMENT (OUTPATIENT)
Dept: CT IMAGING | Facility: HOSPITAL | Age: 42
DRG: 032 | End: 2017-04-17
Attending: NEUROLOGICAL SURGERY
Payer: MEDICARE

## 2017-04-17 PROCEDURE — 70450 CT HEAD/BRAIN W/O DYE: CPT

## 2017-04-17 PROCEDURE — 99232 SBSQ HOSP IP/OBS MODERATE 35: CPT | Performed by: INTERNAL MEDICINE

## 2017-04-17 NOTE — PLAN OF CARE
Complains of mild headache, declines pain medication at this time  Head incisions ESTRELLITA, no drainage noted  No neurological deficits noted  Resting comfortably  Will continue to monitor  Wrist rash noted, dr Deedee Candelario notified  Riaz Faye

## 2017-04-17 NOTE — PROGRESS NOTES
[facility]  Progress Note     Maurice Luna Patient Status:  Inpatient    1975 MRN [id number]   [facility] 7NE-A Attending Enoch Gunter DO     Chief Complaint: s/p  shunt    S: Asked to see patient again regarding a new low back pain  1. Prn tramadol  7. Transaminitis  1. Previous workup includes +anti-mitochondrial antibody concerning for PBC but GI didn't think pattern of abnormal LFTs consistent with this.    2.  Outpatient GI follow up    Will follow peripherally for n

## 2017-04-17 NOTE — PROGRESS NOTES
[facility]  Neurosurgery Progress Note    Rama Grimes Patient Status:  Inpatient    1975 MRN [id number]   [facility] 7NE-A Attending Milo Campuzano, DO   Hosp Day # 5 PCP DILSHAD VELAZQUEZ     Subjective:  Rama Grimes is a 35 year o Left frontal WHO Grade I Meningioma, s/p Craniotomy 11/17/16 by Dr. Luordes Rowell  6. Rash on left wrist    Plan:  1. OOB to chair for all meals  2. Ambulate QID  3. Doxycycline PO x 10 days  4. Pain control   5. Bowel regimen  6.  Hospitalist for medical managemen

## 2017-04-17 NOTE — PLAN OF CARE
Assumed care at [address], Patient alert & oriented X 4, Neuor Q 4, no deficits  Sat >92% on RA, No tele. No IV access  Steri-strips to belly button dry/intact.  Incision to right side of neck and head c/d/i  Pt c/o of itching and hives with little relief from h

## 2017-04-18 ENCOUNTER — APPOINTMENT (OUTPATIENT)
Dept: CT IMAGING | Facility: HOSPITAL | Age: 42
DRG: 032 | End: 2017-04-18
Attending: NEUROLOGICAL SURGERY
Payer: MEDICARE

## 2017-04-18 PROCEDURE — 99232 SBSQ HOSP IP/OBS MODERATE 35: CPT | Performed by: INTERNAL MEDICINE

## 2017-04-18 PROCEDURE — 70450 CT HEAD/BRAIN W/O DYE: CPT

## 2017-04-18 NOTE — PROGRESS NOTES
[facility]  Progress Note     Rama Grimes Patient Status:  Inpatient    1975 MRN [id number]   [facility] 7NE-A Attending Milo Campuzano DO     Chief Complaint: s/p  shunt    S: Rash is better today. Less pruritic.     Rev consistent with this.    2. Outpatient GI follow up    Quality:  · DVT Prophylaxis: SCDs  · CODE status: full  · Jean: np    Estimated date of discharge: tbd  Discharge is dependent on: neurosurg clearance  At this point Ms. Rose Thomas is expected to be dischar

## 2017-04-18 NOTE — PLAN OF CARE
Spoke to Joaquin Sravan FLYNN re: CT findings from this am, possible new area of bleed. She will speak with Dr. Liane Hartman.

## 2017-04-18 NOTE — PLAN OF CARE
Assumed care at [address], alert & oriented X 4, c/o headache with relief from ultram  Neuro Q4, no deficits.  Incisions dry and intact  Sat>92% on RA, NSR monitored on tele  Plan for repeat CT in morning  Updated pt on POC ,call light within reach, will monitor

## 2017-04-19 VITALS
SYSTOLIC BLOOD PRESSURE: 109 MMHG | RESPIRATION RATE: 17 BRPM | WEIGHT: 151.69 LBS | OXYGEN SATURATION: 96 % | BODY MASS INDEX: 23.81 KG/M2 | HEIGHT: 67 IN | DIASTOLIC BLOOD PRESSURE: 68 MMHG | HEART RATE: 87 BPM | TEMPERATURE: 97 F

## 2017-04-19 PROCEDURE — 99232 SBSQ HOSP IP/OBS MODERATE 35: CPT | Performed by: INTERNAL MEDICINE

## 2017-04-19 RX ORDER — DOXYCYCLINE HYCLATE 100 MG/1
100 CAPSULE ORAL EVERY 12 HOURS SCHEDULED
Qty: 12 CAPSULE | Refills: 0 | Status: SHIPPED | OUTPATIENT
Start: 2017-04-19 | End: 2017-04-25

## 2017-04-19 NOTE — PROGRESS NOTES
[facility]  Progress Note     Toña Villegas Patient Status:  Inpatient    1975 MRN [id number]   [facility] 7NE-A Attending Casper Vitaler,      Chief Complaint: s/p  shunt    S: Rash seems to be stable, no erythema, less PPI  4. Anxiety  1. Continue home prn benzo  5. History of L sphenoid wing meningioma s/p resection  6. Chronic low back pain  1. Prn tramadol  7. Transaminitis  1.  Previous workup includes +anti-mitochondrial antibody concerning for PBC but GI didn't thin

## 2017-04-19 NOTE — CM/SW NOTE
04/19/17 1100   Discharge disposition   Discharged to: Home or Self   Name of [facility] services after discharge None   Discharge transportation Private car

## 2017-04-19 NOTE — PLAN OF CARE
NURSING DISCHARGE NOTE    Discharged Home via Ambulatory. Accompanied by Family member  Belongings Taken by patient/family.

## 2017-04-19 NOTE — PLAN OF CARE
Assumed care at 1900. Pt a/ox4. Vss, RA. Denies pain. Head and abd incision cdi. Seizure prec maintained. Npo since mn. Call light in reach.   DISCHARGE PLANNING    • Discharge to home or other facility with appropriate resources Not Progressing

## 2017-04-19 NOTE — PROGRESS NOTES
[facility]  Neurosurgery Progress Note    Zenobia Patton Patient Status:  Inpatient    1975 MRN [id number]   [facility] 7NE-A Attending Lisandro Diego, DO   Hosp Day # 7 PCP DILSHAD VELAZQUEZ     Subjective:  Zenobia Patton is a 38 year o #6  3. Expected post-op pain  4. Hydrocephalus causing left temporal sub-galeal fluid collection  5. Hx Left frontal WHO Grade I Meningioma, s/p Craniotomy 11/17/16 by Dr. Chidi Mendoza  6. Rash on left wrist    Plan:  1. OOB to chair for all meals  2.  Ambulate QI

## 2017-04-20 NOTE — DISCHARGE SUMMARY
[facility]  Discharge Summary    Casey Johnson Patient Status:  Inpatient    1975 MRN [id number]   [facility] 7NE-A Attending No att. providers found   Hosp Day # 7 PCP DILSHAD VELAZQUEZ     Date of Admission: 2017    Date of Dischar Condition: Good    Disposition: Home or Self Care    Discharge Medications: Discharge Medication List as of 4/19/2017  9:57 AM    START taking these medications    Doxycycline Hyclate 100 MG Oral Cap  Take 1 capsule (100 mg total) by mouth every 12 (twelve >101.5 or any drainage/swelling/redness at incision site  Call the office for any further questions at [phone]      **Please have a CT brain completed prior to seeing Dr. Rodrigo Olivarez in the clinic.   Call Central Scheduling at [phone] to schedule your i

## 2017-04-21 ENCOUNTER — HOSPITAL ENCOUNTER (OUTPATIENT)
Dept: CT IMAGING | Facility: HOSPITAL | Age: 42
Discharge: HOME | End: 2017-04-21
Attending: NURSE PRACTITIONER
Payer: OTHER GOVERNMENT

## 2017-04-21 DIAGNOSIS — G91.9 HYDROCEPHALUS (HCC): ICD-10-CM

## 2017-04-21 DIAGNOSIS — G93.89 SUBGALEAL FLUID COLLECTION: ICD-10-CM

## 2017-04-21 PROCEDURE — 70450 CT HEAD/BRAIN W/O DYE: CPT

## 2017-04-23 NOTE — PROGRESS NOTES
[facility]   Outpatient Neurological Surgery Follow Up    Toña Villegas  : 1975  PCP: Ingrid VELAZQUEZ  Referring Provider: No ref.  provider found    REASON FOR VISIT:Patient presents with:  Neurologic Problem: f/u      PATEL

## 2017-04-24 ENCOUNTER — TELEPHONE (OUTPATIENT)
Dept: SURGERY | Facility: CLINIC | Age: 42
End: 2017-04-24

## 2017-04-24 ENCOUNTER — OFFICE VISIT (OUTPATIENT)
Dept: SURGERY | Facility: CLINIC | Age: 42
End: 2017-04-24

## 2017-04-24 VITALS
HEIGHT: 67 IN | SYSTOLIC BLOOD PRESSURE: 96 MMHG | RESPIRATION RATE: 15 BRPM | DIASTOLIC BLOOD PRESSURE: 66 MMHG | TEMPERATURE: 98 F | BODY MASS INDEX: 23.54 KG/M2 | WEIGHT: 150 LBS | HEART RATE: 80 BPM

## 2017-04-24 DIAGNOSIS — Z98.890 POST-OPERATIVE STATE: ICD-10-CM

## 2017-04-24 DIAGNOSIS — T88.8XXD FLUID COLLECTION AT SURGICAL SITE, SUBSEQUENT ENCOUNTER: ICD-10-CM

## 2017-04-24 DIAGNOSIS — Z98.2 S/P VP SHUNT: Primary | ICD-10-CM

## 2017-04-24 DIAGNOSIS — Z98.2 VP (VENTRICULOPERITONEAL) SHUNT STATUS: ICD-10-CM

## 2017-04-24 DIAGNOSIS — D32.9 MENINGIOMA (HCC): ICD-10-CM

## 2017-04-24 PROBLEM — G96.198 PSEUDOMENINGOCELE: Status: ACTIVE | Noted: 2017-04-24

## 2017-04-24 PROCEDURE — 99024 POSTOP FOLLOW-UP VISIT: CPT | Performed by: NURSE PRACTITIONER

## 2017-04-24 NOTE — PATIENT INSTRUCTIONS
Refill policies:    • Allow 2 business days for refills; controlled substances may take longer.   • Contact your pharmacy at least 5 days prior to running out of medication and have them send an electronic request or submit request through the “request re insurance carrier to obtain pre-certification or prior authorization. Unfortunately, [facility] has seen an increase in denial of payment even though the procedure/test has been pre-certified.   You are strongly encouraged to contact your insurance carrier to v

## 2017-04-24 NOTE — OPERATIVE REPORT
[facility]  Operative report    Alfred Antonio Location: OR   [facility] [id number] MRN [id number]   Admission Date 4/12/2017 Operation Date 4/12/2017   Attending Physician No att. providers found Operating Physician Simone Mercer., DO     Pre-Operative Diag Kocher's point was drawn out 11 cm posterior to the glabella and 3 cm off the midline which was in line with the mid pupillary line. A formal timeout was performed.   The patient was prepped and draped in a sterile fashion with the head neck and abdomen in secured using a 0 silk tie. General surgery was asked to pull the slack into the abdomen at this time. Please see their note for the laparoscopic approach to the abdomen.   Under direct laparoscopic visualization the distal catheter was seen to have cereb

## 2017-04-24 NOTE — PROGRESS NOTES
NEUROSURGERY  POSTOPSHUNT FOLLOW UP    REASON FOR VISIT: post-op f/u       HISTORY OF PRESENT ILLNESS:Dania MAHNAZ Mirza Tony is a 38year oldfemale s/p  shunt 4/12/17 d/t with persistent subgaleal fluid collection and persistent fluid beneath left temporal region Rfl: TURMERIC OR Take by mouth. Disp:  Rfl:    POTASSIUM GLUCONATE OR Take 1 tablet by mouth daily. Disp:  Rfl:    Cyanocobalamin (VITAMIN B12 OR) Take 1 tablet by mouth daily.  Disp:  Rfl:      No current facility-administered medications on file prior with tip in the third ventricle is stable.      The right lateral ventricle is completely decompressed.  The left lateral ventricle is not dilated.      Brain parenchyma is otherwise unremarkable.      Paranasal sinus, mastoid air cells and orbits are unrem

## 2017-04-24 NOTE — CDS QUERY
ANGIE RINCON A  Female, 42 years, 04/18/1975  Guar:  MCKENZIERAIANGIE MAHNAZ ID:  [id number]  MRN:  [id number]   CSN:  [id number]    Physician’s Documentation Request: History of   By submitting this query, we are merely seeking further clarification of documentation. subgaleal fluid collection at the left temporal fossa. No   signs of infection. No fevers or incision problems”  4/15 Ct head. Rad “There is 4 mm of rightward midline shift. This is new since 4/13/17”  4/16 Ct head .  Rad “CONCLUSION:     Left frontal/tem

## 2017-04-24 NOTE — PROGRESS NOTES
Pt states \"shunt is not effective, states before surgery fluid collection would be greatest during lying down but would subside and dissipate during the day while in upright position.   Pt states since surgery fluid collection has been greater and no longe

## 2017-04-24 NOTE — OPERATIVE REPORT
[facility]  Operative report    Aj Denton Location: OR   CSN [id number] MRN [id number]   Admission Date 4/12/2017 Operation Date 4/13/2017   Attending Physician No att. providers found Operating Physician Rosalva Julian, DO     Pre-Operative Diag imaging showed that the catheter had no kink in it. The incision was approximated using 4-0 Vicryl suture in an interrupted inverted fashion of the subcuticular layers.   The skin surface was approximated using 4 oh rapidly dissolving Vicryl suture in ru

## 2017-04-24 NOTE — OPERATIVE REPORT
[facility]  Operative report    Sherine Drafts Location: OR   [facility] [id number] MRN [id number]   Admission Date 4/12/2017 Operation Date 4/12/2017   Attending Physician No att. providers found Operating Physician Rodney Michelle., DO     Pre-Operative Diag used to expose the skull. General surgery scrubbed in surgery at this time and the distal catheter with valve attached was tunneled behind the right ear over the neck over the chest to the abdomen.     Neuro navigation was used again to confirm the entry p incision subcuticular layers were approximated using 4-0 Vicryl suture in interrupted inverted fashion. Surface was approximated using 4-0 Vicryl rapidly dissolving suture in a running fashion.   The incision at the base of the neck on the right side was a

## 2017-04-25 ENCOUNTER — TELEPHONE (OUTPATIENT)
Dept: SURGERY | Facility: CLINIC | Age: 42
End: 2017-04-25

## 2018-06-08 NOTE — PLAN OF CARE
NURSING ADMISSION NOTE      Patient admitted via Cart  Oriented to room. Safety precautions initiated. Bed in low position. Call light in reach. Assumed care @7885. Pt AOx4. VSS on RA. Neuro exams @4h. No deficits. Jean D/C in PACU. Voiding. Home

## 2021-09-22 NOTE — CONSULTS
[facility]                       Gastroenterology Consultation-[facility]    Rhiannon Murray Patient Status:  Inpatient    1975 MRN [id number]   [facility] 6NE-A Attending Yordan Taylor, DO   Hosp Day # 3 PCP None Pc Comment x2    IR LUMBAR DRAINAGE  1/26/2017    Comment      Medications:   magnesium hydroxide (MILK OF MAGNESIA) 400 MG/5ML suspension 30 mL 30 mL Oral Daily PRN   ondansetron HCl (ZOFRAN) injection 4 mg 4 mg Intravenous Q4H PRN   [COMPLETED] fentaNYL [] HYDROmorphone HCl PF (DILAUDID) 1 MG/ML injection 0.4 mg 0.4 mg Intravenous Q5 Min PRN   [] Atropine Sulfate 0.1 MG/ML injection 0.5 mg 0.5 mg Intravenous PRN   [] Trimethobenzamide HCl (TIGAN) injection 200 mg 200 mg Intramuscula (FLUARIX) ages 1 & older inj 0.5ml 0.5 mL Intramuscular Prior to discharge   [COMPLETED] dexamethasone Sodium Phosphate (DECADRON) 4 MG/ML injection 6 mg 6 mg Intravenous Once   scopolamine (TRANSDERM-SCOP) 1.5 mg patch 1 patch Transdermal Q72H   [COMPLETE The patient reports no history of seizure, stroke, or frequent headaches  PE: /87 mmHg  Pulse 83  Temp(Src) 99 °F (37.2 °C) (Oral)  Resp 12  Ht 5' 7\" (1.702 m)  Wt 159 lb 2.8 oz (72.2 kg)  BMI 24.92 kg/m2  SpO2 99%  LMP 11/14/2013  Gen: AAO x 3, abl Imaging:   PROCEDURE:  US ABDOMEN COMPLETE (CPT=76700)     COMPARISON:  None.     INDICATIONS:  elevated liver enzymes     TECHNIQUE:  Real time gray-scale ultrasound was used to evaluate the abdomen.  The exam includes images of the liver, gallbladd autoimmune etiology and consider a liver biopsy if values continue to increase     Recommendations:     1. Obtain IVETH, AMA, ASMA, ceruloplasmin, acetaminophen level, and iron studies to blood collected this morning   2.  Recommend changing class of antibiot Bexarotene Counseling:  I discussed with the patient the risks of bexarotene including but not limited to hair loss, dry lips/skin/eyes, liver abnormalities, hyperlipidemia, pancreatitis, depression/suicidal ideation, photosensitivity, drug rash/allergic reactions, hypothyroidism, anemia, leukopenia, infection, cataracts, and teratogenicity.  Patient understands that they will need regular blood tests to check lipid profile, liver function tests, white blood cell count, thyroid function tests and pregnancy test if applicable.

## (undated) DEVICE — 3M™ IOBAN™ 2 ANTIMICROBIAL INCISE DRAPE 6650EZ: Brand: IOBAN™ 2

## (undated) DEVICE — STERILE POLYISOPRENE POWDER-FREE SURGICAL GLOVES: Brand: PROTEXIS

## (undated) DEVICE — LAMINECTOMY CDS: Brand: MEDLINE INDUSTRIES, INC.

## (undated) DEVICE — SHEET,DRAPE,70X100,STERILE: Brand: MEDLINE

## (undated) DEVICE — DRAPE C-ARM UNIVERSAL

## (undated) DEVICE — SUTURE VICRYL 0 CT-2

## (undated) DEVICE — ENDOPATH ULTRA VERESS INSUFFLATION NEEDLES WITH LUER LOCK CONNECTORS: Brand: ENDOPATH

## (undated) DEVICE — SUTURE NUROLON 4-0 TF

## (undated) DEVICE — KIT NEUROSURG SHUNT CSF NONINV

## (undated) DEVICE — TRANSPOSAL ULTRAFLEX DUO/QUAD ULTRA CART MANIFOLD

## (undated) DEVICE — SUTURE MONOCRYL 4-0 PS-2

## (undated) DEVICE — GAUZE SPONGES,12 PLY: Brand: CURITY

## (undated) DEVICE — DISPOSABLE BIPOLAR FORCEPS 4" (10.2CM) JEWELERS, STRAIGHT 0.4MM TIP AND 12 FT. (3.6M) CABLE: Brand: KIRWAN

## (undated) DEVICE — KENDALL SCD EXPRESS SLEEVES, THIGH LENGTH, MEDIUM: Brand: KENDALL SCD

## (undated) DEVICE — ENDOPATH XCEL UNIVERSAL TROCAR STABLILITY SLEEVES: Brand: ENDOPATH XCEL

## (undated) DEVICE — SHEET, DRAPE, SPLIT, STERILE: Brand: MEDLINE

## (undated) DEVICE — COVER,MAYO STAND,STERILE: Brand: MEDLINE

## (undated) DEVICE — 3M(TM) STERI-STRIP(TM) ANTIMICROBIAL SKIN CLOSURES (REINFORCED) A1846: Brand: 3M™ STERI-STRIP™

## (undated) DEVICE — SUPER SPONGES,MEDIUM: Brand: KERLIX

## (undated) DEVICE — #11 STERILE BLADE: Brand: POLYMER COATED BLADES

## (undated) DEVICE — RETRACTOR LONE STAR STAYS LG

## (undated) DEVICE — BANDAGE ROLL,100% COTTON, 6 PLY, LARGE: Brand: KERLIX

## (undated) DEVICE — UNDYED BRAIDED (POLYGLACTIN 910), SYNTHETIC ABSORBABLE SUTURE: Brand: COATED VICRYL

## (undated) DEVICE — 6.0MM PRECISION ROUND

## (undated) DEVICE — CAUTERY BLADE 2IN INS E1455

## (undated) DEVICE — CORD MONOPOLAR DISP

## (undated) DEVICE — PREP SCRUB EZ HIBICLENS

## (undated) DEVICE — BANDAID COVERLET 1X3

## (undated) DEVICE — GOWN,SIRUS,FABRIC-REINFORCED,X-LARGE: Brand: MEDLINE

## (undated) DEVICE — SOL  .9 1000ML BTL

## (undated) DEVICE — ADHESIVE MASTISOL 2/3CC VL

## (undated) DEVICE — 3M(TM) TEGADERM(TM) TRANSPARENT FILM DRESSING FRAME STYLE 9505W: Brand: 3M™ TEGADERM™

## (undated) DEVICE — DRILL SRG OIL CRTDG MAESTRO

## (undated) DEVICE — PROXIMATE SKIN STAPLERS (35 WIDE) CONTAINS 35 STAINLESS STEEL STAPLES (FIXED HEAD): Brand: PROXIMATE

## (undated) DEVICE — CODMAN® SURGICAL PATTIES 1" X 3" (2.54CM X 7.62CM): Brand: CODMAN®

## (undated) DEVICE — ENDOPATH XCEL WITH OPTIVIEW TECHNOLOGY BLADELESS TROCARS WITH STABILITY SLEEVES: Brand: ENDOPATH XCEL OPTIVIEW

## (undated) DEVICE — SUTURE VICRYL PLUS 4-0 PS-2

## (undated) DEVICE — 3M™ COBAN™ NL STERILE NON-LATEX SELF-ADHERENT WRAP, 2084S, 4 IN X 5 YD (10 CM X 4,5 M), 18 ROLLS/CASE: Brand: 3M™ COBAN™

## (undated) DEVICE — SUTURE SILK 0

## (undated) DEVICE — LIGHT HANDLE

## (undated) DEVICE — SUTURE VICRYL 2-0 CP-2

## (undated) DEVICE — SOLUTION ANSEP 70% ISOPRPNL

## (undated) DEVICE — GENERAL LAPAROS CDS-LF: Brand: MEDLINE INDUSTRIES, INC.

## (undated) DEVICE — Device

## (undated) DEVICE — CRANIOTOMY CDS: Brand: MEDLINE INDUSTRIES, INC.

## (undated) DEVICE — REM POLYHESIVE ADULT PATIENT RETURN ELECTRODE: Brand: VALLEYLAB

## (undated) DEVICE — SUTURE VICRYL 2-0 CT-2

## (undated) DEVICE — DRAPE MICROSCOPE NEURO PENTERO

## (undated) DEVICE — VISUALIZATION SYSTEM: Brand: CLEARIFY

## (undated) DEVICE — DRAPE,U/SHT,SPLIT,FILM,60X84,STERILE: Brand: MEDLINE

## (undated) DEVICE — MARKER SKIN 2 TIP

## (undated) DEVICE — SOLUTION SURG DURA PREP HAZMAT

## (undated) DEVICE — INTENDED USED TO PROTECT, TAG AND HELP LOCATED SUTURES DURING SURGERY: Brand: STERION®SUTURE AID BOOTIES

## (undated) DEVICE — CODMAN® DISPOSABLE CATHETER PASSER: Brand: CODMAN®

## (undated) DEVICE — MONOFILAMENT ABSORBABLE SUTURE: Brand: MAXON

## (undated) DEVICE — PAD SACRAL SPAN AID

## (undated) NOTE — IP AVS SNAPSHOT
[facility] [place] [address] [place], [address] ~ [phone]                Discharge Summary   1/3/2017    Murphy Diaz           Admission Information        Provider Department    1/3/2017 Beba Palomo.  DO Leonardo Daugherty 7ne-A         Ita Jeff Please  your prescriptions at the location directed by your doctor or nurse     Bring a paper prescription for each of these medications    - Codeine Sulfate 60 MG Tabs  - Sulfamethoxazole-TMP -160 MG Tabs per tablet              Patient Instr 2.52 (01/03/17)  0.52 (01/03/17)  0.21 (01/03/17)  0.11 (H)    (11/18/16)  82.0 (11/18/16)  12.0 (11/18/16)  4.8 (11/18/16)  0.0 (11/18/16)  0.1  (11/18/16)  12.54 (H) (11/18/16)  1.84 (11/18/16)  0.73 (H) (11/18/16)  0.00 (11/18/16)  2.73      Metabolic L visit,  view other health information, and more. To sign up or find more information, go to [email or web address] and click on the Sign Up Now link in the [employer] box.      Enter your [employer] Activation Code exactly as it appears below along with yo GI Medications     omeprazole 20 MG Oral Capsule Delayed Release       Use:  Nausea/vomiting, acid reflux, low bowel motility, stomach pain   Most common side effects:  Depends on the specific medication but generally include: diarrhea, constipation,

## (undated) NOTE — MR AVS SNAPSHOT
[facility], [address] [address]               Thank you for choosing us for your health care visit with Justine Sullivan. Rufino Schroeder.   We are glad to serve you and happy to provide you with this ? Patient must present photo ID at time of . If a designated family member will be picking up prescription, office must be given name of individual in advance and they must present an ID as well. ?  The name of the person picking up your prescripti Morphine Nausea and vomiting    Percocet [Kdc:Acetaminophen+Oxycodone+Tartrazine] Nausea and vomiting    Adhesive Tape Hives                   Current Medications          This list is accurate as of: 2/13/17  6:38 PM.  Always use your most recent med lis office, you can view your past visit information in [employer] by going to Visits < Visit Summaries. [employer] questions? Call [phone] for help. [employer] is NOT to be used for urgent needs. For medical emergencies, dial 911.            Visit EDWARD-EL

## (undated) NOTE — LETTER
[facility] Department  Phone: [phone]  Right Fax: [phone]  \[facility]\"" 20 Pat Boateng RN Date: 17    Patient Name: Franka Stage  Surgery Date: 2017    CSN: [id number]  Medical Record: [id number]   :

## (undated) NOTE — MR AVS SNAPSHOT
[facility]  10 .  [facility], [address] [address] [phone]               Thank you for choosing us for your health care visit with Marlee Deshpande MD.  We are glad to serve you and happy to provide you with this summary of y information at time of your appointment. FOR FEMALES HAVING GADOLINIUM EXAMS :If you are breastfeeding and your exam requires an IV Contrast (Gadolinium) injection, you need to stop breastfeeding for 24-48 hours after the procedure.        IF A CHILD is sc medications prescribed for you. Read the directions carefully, and ask your doctor or other care provider to review them with you. BlueSnap     Sign up for BlueSnap, your secure online medical record.   BlueSnap will allow you to access patient inst

## (undated) NOTE — LETTER
[facility] Department  Phone: [phone]  Right Fax: [phone]  [facility] 20 By:  Ruba Mckay RN Date: 17    Patient Name: Ute Healy  Surgery Date: 2017    CSN: [id number]  Medical Record: [id number]   :

## (undated) NOTE — Clinical Note
3/30/2017          RE: Jose David Swartz     : 1975    Dear Dr. Obdulia Bond,    This letter is to inform you that your patient is being scheduled for surgery with Dr. Liane Hartman on 17 at [facility].    Diagnosis: Hydrocephalus  Procedure: Georgi Peacock

## (undated) NOTE — MR AVS SNAPSHOT
After Visit Summary   1/3/2017    Juanis Gentile    MRN: [id number]           Allergies     No Known Allergies      Your Vital Signs Were     BP Pulse Temp(Src) Resp    112/70 mmHg 71 99.3 °F (37.4 °C) (Tympanic) 18    Height Weight BMI Last Period

## (undated) NOTE — LETTER
Filomena Woo 182 6 [address]  [place], [address] [facility]    Consent for Operation  Date: __________________                                Time: _______________    1.  I authorize the performance upon uLis Cassidy the following operation:  Left frontal procedure has been videotaped, the surgeon will obtain the original videotape. The hospital will not be responsible for storage or maintenance of this tape.     6. For the purpose of advancing medical education, I consent to the admittance of observers to t STATEMENTS REQUIRING INSERTION OR COMPLETION WERE FILLED IN.     Signature of Patient:   ___________________________    When the patient is a minor or mentally incompetent to give consent:  Signature of person authorized to consent for patient: ____________

## (undated) NOTE — IP AVS SNAPSHOT
[facility] [place] [address] [place], [address] ~ [phone]                Discharge Summary   1/24/2017    [facility]           Admission Information        Provider Department    1/24/2017 Akiko Gates.  DO Leonardo Daugherty 6ne-A         T [    ]    [    ]    [    ]         Waqas Reina taking these medications        Instructions Authorizing Provider    Morning Afternoon Evening As Needed    acetaminophen 325 MG Tabs   Last time this was given:  650 mg on 1/26/2017  3:59 AM   Commonly known as [phone]          Follow up with Nagi Duran, DO. Go in 2 weeks.     Specialty:  NEUROSURGERY    Why:  As needed, If symptoms worsen    Contact information:    [address] [phone]        Future Appointments [facility] Only: Initiate antibiotics?:      Other Pre-op Orders:      PAT Orders:  Type and Screen    Urinalysis    Other PAT tests:  USE CBC, BMP, PT, PTT AND MRSA FROM 1/3/2017 AND 1/4/2017    Patient Preferred Phone Number:  [phone]    Is there [facility] Abs Final Neut Abs Lymphocyte Abso Monocyte Absolu Eosinophil Abso Basophil Absolu    (01/27/17)  59.1 (01/27/17)  31.0 (01/27/17)  5.3 (01/27/17)  2.1 (01/27/17)  1.0 -- (01/27/17)  5.18 (01/27/17)  2.71 (01/27/17)  0.46 (01/27/17)  0.18 (01/27/17)  0.09 coverage. Patient [address] is a Federal Navigator program that can help with your Affordable Care Act coverage, as well as all types of Medicaid plans.   To get signed up and covered, please call [phone] and ask to get set up for an insuran What to report to your healthcare team: Tolerance of medications, temperature, rash, itching, shortness of breath, chills, nausea, and diarrhea           Narcotic Medications     TraMADol HCl 50 MG Oral Tab    TraMADol HCl 50 MG Oral Tab       Use:  Treat

## (undated) NOTE — IP AVS SNAPSHOT
[facility] [place] [address] [place], [address] ~ [phone]                Discharge Summary   4/12/2017    [facility]           Admission Information        Provider Department    4/12/2017 Akiko Gates.  DO Leonardo Daugherty 7ne-A Next dose due:  4/20        1 tablet daily. omeprazole 20 MG Cpdr   Commonly known as:  PRILOSEC   Next dose due: Tonight at bedtime        Take 20 mg by mouth 2 (two) times daily.                                POTASSIUM GLUCONA Contact information:    [address] [facility] [phone]        Future Appointments     Apr 24, 2017 10:00 AM   Postop with GEE Avery   [address] [facility] ([facility] BMP    PT / INR    Urinalysis    MRSA / MSSA per protocol    Other PAT tests:  PTT    Patient Preferred Phone Number:  [phone]    Is there another family member we can call?:       needed:  No    History of Sleep Apnea?:  No    Nursing Linh 0.00 (04/14/17)  0.02    (04/13/17)  75.0 (04/13/17)  18.6 (04/13/17)  5.4 (04/13/17)  0.2 (04/13/17)  0.3  (04/13/17)  9.36 (H) (04/13/17)  2.32 (04/13/17)  0.68 (H) (04/13/17)  0.02 (04/13/17)  0.04    (03/07/17)  43.7 (03/07/17)  45.5 (03/07/17)  4.9 (0 discharge instructions in [employer] by going to Visits < Admission Summaries. If you've been to the Emergency Department or your doctor's office, you can view your past visit information in [employer] by going to Visits < Visit Summaries. [email or web address] questions? Non-Narcotic Pain Medications     acetaminophen 325 MG Oral Tab       Use: Treat pain, fever, inflammation   Most common side effects: Stomach upset   What to report to your healthcare team: Stomach upset, unresolved pain           GI Medications     omepr

## (undated) NOTE — LETTER
[facility]  Anum Maza 61 5575 [facility], [address] [place] [address]    Consent for Operation    Date: __________________    Time: _______________    1.  I authorize the performance upon Ke Mccullough the following operation:    Procedure(s):   SHUNT WITH LAPAROSCOP procedure has been videotaped, the surgeon will obtain the original videotape. The hospital will not be responsible for storage or maintenance of this tape.     6. For the purpose of advancing medical education, I consent to the admittance of observers to t STATEMENTS REQUIRING INSERTION OR COMPLETION WERE FILLED IN.     Signature of Patient:   ___________________________    When the patient is a minor or mentally incompetent to give consent:  Signature of person authorized to consent for patient: ____________ drugs/illegal medications). Failure to inform my anesthesiologist about these medicines may increase my risk of anesthetic complications. · If I am allergic to anything or have had a reaction to anesthesia before.     3. I understand how the anesthesia med I have discussed the procedure and information above with the patient (or patient’s representative) and answered their questions. The patient or their representative has agreed to have anesthesia services.     _______________________________________________

## (undated) NOTE — MR AVS SNAPSHOT
1160 [facility]  [address] [phone]               Thank you for choosing us for your health care visit with Justine Sullivan. Rufino Schroeder.   We are glad to serve you and happy to provide you with this of individual in advance and they must present an ID as well. ? The name of the person picking up your prescription must be documented in your chart.   Scheduling Tests    If your physician has ordered radiology tests such as MRI or CT scans, do not schedu [employer] will allow you to access patient instructions from your recent visit,  view other health information, and more. To sign up or find more information, go to [email or web address] and click on the Sign Up Now link in the [employer] box.      Enter

## (undated) NOTE — LETTER
[facility]  Anum Maryanhuyen 61 5324 [facility], [address] [place] [address]    Consent for Operation    Date: __________________    Time: _______________    1.  I authorize the performance upon Bianca Felipe the following operation:    Procedure(s):  Left frontal cranial wou procedure has been videotaped, the surgeon will obtain the original videotape. The hospital will not be responsible for storage or maintenance of this tape.     6. For the purpose of advancing medical education, I consent to the admittance of observers to t STATEMENTS REQUIRING INSERTION OR COMPLETION WERE FILLED IN.     Signature of Patient:   ___________________________    When the patient is a minor or mentally incompetent to give consent:  Signature of person authorized to consent for patient: ____________ drugs/illegal medications). Failure to inform my anesthesiologist about these medicines may increase my risk of anesthetic complications. · If I am allergic to anything or have had a reaction to anesthesia before.     3. I understand how the anesthesia med I have discussed the procedure and information above with the patient (or patient’s representative) and answered their questions. The patient or their representative has agreed to have anesthesia services.     _______________________________________________

## (undated) NOTE — MR AVS SNAPSHOT
[facility], [facility]  Guerline Santiago  92. [address]  [phone]               Thank you for choosing us for your health care visit with Cruz Healy. Varsha Buckley.   We are glad to serve you and happy to provide you with this summary Return in about 6 weeks (around 2/14/2017) for f/u Dr Luis Miller.       Scheduling Instructions     Tuesday January 03, 2017     Imaging:  MRI BRAIN (W+WO) (CKW=[id number])    Instructions:  IMPORTANT    Your physician has ordered a radiology test that may require a

## (undated) NOTE — MR AVS SNAPSHOT
1160 [facility]  [address] [address]               Thank you for choosing us for your health care visit with Calvin Stephens. Will Toledo.   We are glad to serve you and happy to provide you with this ? Allow 2 business days for refills; controlled substances may take longer. ?  Contact your pharmacy at least 5 days prior to running out of medication and have them send an electronic request or submit request through the “request refill” option in your M If your physician has recommended that you have a procedure or additional testing performed. [facility]) will contact your insurance carrier to obtain pre-certification or prior authorization.     Unfortunately, VANDANA has seen an incr Take 1 tablet by mouth daily. MyChart     Visit Thinker Thinghart  You can access your MyChart to more actively manage your health care and view more details from this visit by going to [email or web address].   If you've recently had a stay a

## (undated) NOTE — MR AVS SNAPSHOT
[facility], [place] [address]               Thank you for choosing us for your health care visit with Alva Santiago. Riky Almanza.   We are glad to serve you and happy to provide you with this to obtain this authorization for your ordered radiology test.    To schedule an appointment for your radiology test please call Xavi Maciel Scheduling   at [phone].          Reason for Today's Visit     Neurologic Problem           Medical If your physician has ordered radiology tests such as MRI or CT scans, do not schedule the test until this office has notified you that the test has been approved by your insurer. Depending on your insurance carrier, approval may take 3-10 days.  It is hig Take as directed on package. Commonly known as:  MEDROL           TraMADol HCl 50 MG Tabs   Take 1-2 tablets ( mg total) by mouth every 6 (six) hours as needed. Commonly known as:  ULTRAM           VITAMIN B12 OR   Take 1 tablet by mouth daily.

## (undated) NOTE — MR AVS SNAPSHOT
[facility], [place] [address]               Thank you for choosing us for your health care visit with Vasquez Grijalva. Maddison aZyas.   We are glad to serve you and happy to provide you with this  protocol for controlled substances:  Written prescriptions    ? Written prescriptions must be picked up in office. ? Please allow the office 48-72 hours to fill the prescription. ? Patient must present photo ID at time of .   If a designat Today's Vital Signs     BP Pulse                120/80 mmHg 100             Current Medications          This list is accurate as of: 3/8/17 11:59 PM.  Always use your most recent med list.                acetaminophen 325 MG Tabs   Take 325 mg by mouth ev

## (undated) NOTE — MR AVS SNAPSHOT
[address] [place] [phone]               Thank you for choosing us for your health care visit with Tamar Wahl. Shelia Ellsworth.   We are glad to serve you and happy to provide you with this ? EFFECTIVE April 1, 2017 PATIENTS MUST  THEIR OWN NARCOTIC PRESCRIPTIONS. ? Written prescriptions must be picked up in office. ? Please allow the office 48-72 hours to fill the prescription. ? Patient must present photo ID at time of . · No blood thinning medications, over the counter non-steroidal antiinflammatories, herbal supplements, vitamin E, fish oil or krill oil for at least 7-14 days prior to surgery.    · You may only take Tylenol, Extra Strength Tylenol, Arthritis Tylenol, or p Take 1-2 tablets ( mg total) by mouth every 6 (six) hours as needed. Commonly known as:  ULTRAM           TURMERIC OR   Take by mouth.            TYLENOL PM EXTRA STRENGTH  MG Tabs   Generic drug:  Diphenhydramine-APAP (sleep)   Take 2 tablets

## (undated) NOTE — LETTER
[facility]  nAum Maza 61 6880 [facility], [address] [place] [address]    Consent for Operation    Date: __________________    Time: _______________    1.  I authorize the performance upon Cam Borrero the following operation:    Procedure(s):  Ventriculoperitoneal s procedure has been videotaped, the surgeon will obtain the original videotape. The hospital will not be responsible for storage or maintenance of this tape.     6. For the purpose of advancing medical education, I consent to the admittance of observers to t STATEMENTS REQUIRING INSERTION OR COMPLETION WERE FILLED IN.     Signature of Patient:   ___________________________    When the patient is a minor or mentally incompetent to give consent:  Signature of person authorized to consent for patient: ____________ drugs/illegal medications). Failure to inform my anesthesiologist about these medicines may increase my risk of anesthetic complications. · If I am allergic to anything or have had a reaction to anesthesia before.     3. I understand how the anesthesia med I have discussed the procedure and information above with the patient (or patient’s representative) and answered their questions. The patient or their representative has agreed to have anesthesia services.     _______________________________________________

## (undated) NOTE — MR AVS SNAPSHOT
[facility], [address], [address] [place], [place] [id number]               Thank you for choosing us for your health care visit with GEE Zurita.   We are glad to serve you and happy to provide you with this Instructions and Information about Your Health      Refill policies:    ? Allow 2 business days for refills; controlled substances may take longer. ?  Contact your pharmacy at least 5 days prior to running out of medication and have them send an electronic testing performed. [facility]) will contact your insurance carrier to obtain pre-certification or prior authorization.     Unfortunately, VANDANA has seen an increase in denial of payment even though the procedure/test has been pre-cert Generic drug:  Diphenhydramine-APAP (sleep)   Take 2 tablets by mouth nightly as needed. VISINE OP   1 drop by Each eye route daily. VITAMIN B12 OR   Take 1 tablet by mouth daily.                    MyChart     Visit MyChart  You can acc

## (undated) NOTE — MR AVS SNAPSHOT
1160 [facility] 92. [phone]  [phone]               Thank you for choosing us for your health care visit with Akiko Velasquez.   We are glad to serve you and happy to provide you with this summary [employer] will allow you to access patient instructions from your recent visit,  view other health information, and more. To sign up or find more information, go to [email or web address] and click on the Sign Up Now link in the [employer] box.      Enter

## (undated) NOTE — MR AVS SNAPSHOT
After Visit Summary   1/10/2017    Reg Burrell    MRN: [id number]           Allergies     Adhesive Tape Hives      Your Vital Signs Were     BP Pulse Temp(Src) Resp    105/66 mmHg 91 99.8 °F (37.7 °C) (Tympanic) 20    Height Weight BMI SpO2    1.702 information, go to [email or web address] and click on the Sign Up Now link in the [employer] box. Enter your [employer] Activation Code exactly as it appears below along with your Zip Code and Date of Birth to complete the sign-up process.  If you do